# Patient Record
Sex: FEMALE | Race: WHITE | NOT HISPANIC OR LATINO | URBAN - METROPOLITAN AREA
[De-identification: names, ages, dates, MRNs, and addresses within clinical notes are randomized per-mention and may not be internally consistent; named-entity substitution may affect disease eponyms.]

---

## 2017-09-23 ENCOUNTER — EMERGENCY (EMERGENCY)
Facility: HOSPITAL | Age: 37
LOS: 1 days | Discharge: HOME | End: 2017-09-23
Attending: EMERGENCY MEDICINE | Admitting: EMERGENCY MEDICINE
Payer: MEDICAID

## 2017-09-23 VITALS
DIASTOLIC BLOOD PRESSURE: 87 MMHG | TEMPERATURE: 98 F | RESPIRATION RATE: 16 BRPM | HEART RATE: 88 BPM | OXYGEN SATURATION: 100 % | SYSTOLIC BLOOD PRESSURE: 128 MMHG

## 2017-09-23 DIAGNOSIS — R69 ILLNESS, UNSPECIFIED: ICD-10-CM

## 2017-09-23 DIAGNOSIS — F43.20 ADJUSTMENT DISORDER, UNSPECIFIED: ICD-10-CM

## 2017-09-23 PROCEDURE — 99284 EMERGENCY DEPT VISIT MOD MDM: CPT

## 2017-09-23 PROCEDURE — 90792 PSYCH DIAG EVAL W/MED SRVCS: CPT

## 2017-09-23 NOTE — ED PROVIDER NOTE - OBJECTIVE STATEMENT
37f, pmhx depression. Her mother called 911 due to agitation, throwing things at home. She had a recent hand surgery @ [facility] for a tendon repair. In the midst of complications from that, she found out this am her boyfriend has been cheating on her and she said the combination caused her to be very angry and throw things. Denies any new medical symptoms. Specifically, denies f/c, weight loss, change in skin or hair, intolerance to hot or cold. Denies any nre polyuria or polydipsia. Also denies any new headache, stiff neck, weakness, numbness, parasthesias, change in vision, ataxia, nausea or vomiting. Denies any recent drug use. no SI/HI.   for her hand, she saw her surgeon yesterday who rx an antibiotic. she said it was somewhat improved. she waw given a splint and told not to use her hand.
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
## 2017-09-23 NOTE — ED PROVIDER NOTE - MEDICAL DECISION MAKING DETAILS
for pt's hand. she has f/u on monday. told to continue her surgeon's recs. no need for ED plastics consult.   Patient presenting for psychiatric complaints. History and physical exam reveal no acute medical condition as the cause of the patient's presenting psychiatric symptoms. Specifically, the vital signs are within normal limits, the patient exhibits no signs of an acute neurological condition, thyroid dysfunction or infectious symptoms suggestive of encephalitis, meningitis or sepsis as a cause of their psychiatric complaints. The patient also shows no signs of acute alcohol or drug intoxication. The patient is medically clear for psychiatric evaluation. If at any time the patient's condition changes, the covering emergency physician should be consulted.

## 2017-09-23 NOTE — ED ADULT NURSE NOTE - OBJECTIVE STATEMENT
Received pt in  pt in [facility]  calm & cooperative denies Si/Hi/AVh at present eval on going.

## 2017-09-23 NOTE — ED ADULT TRIAGE NOTE - CHIEF COMPLAINT QUOTE
Pt brought in by EMS for BH eval/ aggressive behavior. 911 was called after pt was throwing things around in her house. Pt denies SI/HI. Pt also complaining of pain to Rt hand.

## 2017-09-23 NOTE — ED BEHAVIORAL HEALTH ASSESSMENT NOTE - SUMMARY
38 yo F sent in by EMS after trashing house.  Acutely affected by learning  was cheating on her.  Now calm and stable.  No SI or HI.  No AH or VH. Does not want admission and has capacity.  No clinical indication for admission.  Has outpatient psychiatrist.  To dicharge home.

## 2018-03-02 ENCOUNTER — EMERGENCY (EMERGENCY)
Facility: HOSPITAL | Age: 38
LOS: 1 days | Discharge: HOME | End: 2018-03-02
Admitting: EMERGENCY MEDICINE
Payer: MEDICAID

## 2018-03-02 VITALS
TEMPERATURE: 98 F | HEART RATE: 84 BPM | OXYGEN SATURATION: 100 % | RESPIRATION RATE: 17 BRPM | DIASTOLIC BLOOD PRESSURE: 93 MMHG | SYSTOLIC BLOOD PRESSURE: 110 MMHG

## 2018-03-02 PROCEDURE — 99283 EMERGENCY DEPT VISIT LOW MDM: CPT

## 2018-03-02 RX ADMIN — Medication 1 MILLIGRAM(S): at 17:18

## 2018-03-02 NOTE — ED PROVIDER NOTE - OBJECTIVE STATEMENT
36 y/o M hx Depression  BIBA      Denies falling, punching or kicking any objects. Denies pain, SOB, fever, chills, chest/ abdominal  discomfort. Denies SI/HI/AH/VH. Denies recent use of alcohol or illicit drugs. 36 y/o M hx Depression, Migraine   BIB family w c/o intermittent anxiety and panic attacks. States that has  recently prescribed Zoloft which seem not to be working . Admits that at times  she's nervous with a racing heart rate that last only for a few minutes. States that remembering her brother who   8 months ago is a trigger. Denies falling, punching or kicking any objects. Denies pain, SOB, fever, chills, chest/ abdominal  discomfort. Denies SI/HI/AH/VH. Denies recent use of alcohol or illicit drugs.  LNMP- 2018

## 2018-03-02 NOTE — ED ADULT TRIAGE NOTE - CHIEF COMPLAINT QUOTE
pt c/o worsening depression since her brother passed away, states "I am unable to function" denies si/hi/ah/vh. pt tearful in triage.  spoke with  NP, pt to go directly back.

## 2018-03-02 NOTE — ED PROVIDER NOTE - MEDICAL DECISION MAKING DETAILS
38 y/o M hx Depression, Migraine    Dx- Panic Attacks.   Tolerated ativan well.   Medical evaluation performed. There is no clinical evidence of intoxication or any acute medical problem requiring immediate intervention.  To follow up with the [facility].

## 2018-03-02 NOTE — ED ADULT NURSE REASSESSMENT NOTE - NS ED NURSE REASSESS COMMENT FT1
Patient cleared and discharged by CRISELDA Jose, discharge instructions given, pt verbalized understanding and left ER a&ox3 with .

## 2018-03-03 ENCOUNTER — EMERGENCY (EMERGENCY)
Facility: HOSPITAL | Age: 38
LOS: 1 days | Discharge: HOME | End: 2018-03-03
Attending: EMERGENCY MEDICINE | Admitting: EMERGENCY MEDICINE
Payer: MEDICAID

## 2018-03-03 VITALS
RESPIRATION RATE: 16 BRPM | SYSTOLIC BLOOD PRESSURE: 144 MMHG | OXYGEN SATURATION: 99 % | DIASTOLIC BLOOD PRESSURE: 82 MMHG | TEMPERATURE: 98 F | HEART RATE: 105 BPM

## 2018-03-03 DIAGNOSIS — Z63.4 DISAPPEARANCE AND DEATH OF FAMILY MEMBER: ICD-10-CM

## 2018-03-03 DIAGNOSIS — F32.9 MAJOR DEPRESSIVE DISORDER, SINGLE EPISODE, UNSPECIFIED: ICD-10-CM

## 2018-03-03 DIAGNOSIS — F41.9 ANXIETY DISORDER, UNSPECIFIED: ICD-10-CM

## 2018-03-03 PROCEDURE — 93010 ELECTROCARDIOGRAM REPORT: CPT

## 2018-03-03 PROCEDURE — 90792 PSYCH DIAG EVAL W/MED SRVCS: CPT

## 2018-03-03 PROCEDURE — 99284 EMERGENCY DEPT VISIT MOD MDM: CPT | Mod: 25

## 2018-03-03 RX ORDER — ZOLPIDEM TARTRATE 10 MG/1
5 TABLET ORAL AT BEDTIME
Qty: 0 | Refills: 0 | Status: DISCONTINUED | OUTPATIENT
Start: 2018-03-03 | End: 2018-03-03

## 2018-03-03 RX ADMIN — Medication 1 MILLIGRAM(S): at 03:23

## 2018-03-03 SDOH — SOCIAL STABILITY - SOCIAL INSECURITY: DISSAPEARANCE AND DEATH OF FAMILY MEMBER: Z63.4

## 2018-03-03 NOTE — ED ADULT NURSE REASSESSMENT NOTE - REASSESS COMMUNICATION
d/c instrcutions provided, pt to be picked up by bf for safe d/c to home/ED physician notified/family informed

## 2018-03-03 NOTE — ED ADULT NURSE REASSESSMENT NOTE - GENERAL PATIENT STATE
Received pt from RN break coverage, awake, slightly anxious, denies s/i h/i. Pt received STAT Ativan 1mg PO as ordered.

## 2018-03-03 NOTE — ED ADULT NURSE NOTE - OBJECTIVE STATEMENT
BIB self from home, pt arrives awake, anxious and restless. Pt reports she was d/c from  ED and referred to crisis clinic upon d/c. Pt is tearful regarding her brother's death in 2017. Reports she was sitting at dinner with her kids and then felt like "I couldn't do it". pt reports passive suicidal thoughts, denies intent or plan to harm self. Denies illicit drug use, reports drinking wine "today in the afternoon". Denies AVH.

## 2018-03-03 NOTE — ED BEHAVIORAL HEALTH ASSESSMENT NOTE - OTHER PAST PSYCHIATRIC HISTORY (INCLUDE DETAILS REGARDING ONSET, COURSE OF ILLNESS, INPATIENT/OUTPATIENT TREATMENT)
depression and anxiety -- past SSRI trials and current Buspar use depression and anxiety -- past SSRI trials, Buspar; on ambien and not taking the lexapro  No psychiatric care at present time

## 2018-03-03 NOTE — ED BEHAVIORAL HEALTH ASSESSMENT NOTE - ADDITIONAL DETAILS / COMMENTS
spoke with pt's BF who has no safety concerns. States that patient is able to take care of "everything" denies any recent suicidality, "she never said that she wants to hurt herself" pt is not talking to self, not internally preoccupied

## 2018-03-03 NOTE — ED BEHAVIORAL HEALTH ASSESSMENT NOTE - SUMMARY
36 yo F sent in by EMS after trashing house.  Acutely affected by learning  was cheating on her.  Now calm and stable.  No SI or HI.  No AH or VH. Does not want admission and has capacity.  No clinical indication for admission.  Has outpatient psychiatrist.  To dicharge home. 38 yo F with mild symptoms of depression and anxiety, also c/o insomnia. Prescribed ambien by her PCP, lexapro and valium for muscle spasm Did not take her meds earlier "because the pain management MD told "me not to take the medications if I am going to ER"   Now calm and stable.  No SI or HI.  No AH or VH. Does not want admission and has capacity.  No clinical indication for admission.  Has enough medications at home, except lexapro By the end of the interview she states that she is also prescribed Adderall  .  ASking for adderral ; when she was explained that she can't have it at night; she asked for ambien Ativan given Will discharge patient  home.

## 2018-03-03 NOTE — ED PROVIDER NOTE - OBJECTIVE STATEMENT
Pt is a 36 y/o F with PMHX of depression and chronic back pain presents to the ED with anxiety, palpitation, SOB x2 days. Pt is feeling guilty because her children depend on her and she has been feeling depressed since her brother  8 months ago. She reports crying every day. She was seen here early today, given Ativan, and sent to [facility]. She was told to come back if she was feeling unwell, so she returned. Children are safe w/ grandmother. No real SI. Given that second visit, will have psych see pt. Mentally seems fine. Will get EKG and UCG given the palpitations.

## 2018-03-03 NOTE — ED BEHAVIORAL HEALTH ASSESSMENT NOTE - DIFFERENTIAL
adjustment disorder with disturbance of conduct vs MDD vs NAOMI adjustment disorder with depressed and anxious mood  vs MDD vs NAOMI

## 2018-03-03 NOTE — ED BEHAVIORAL HEALTH ASSESSMENT NOTE - SUICIDE PROTECTIVE FACTORS
Identifies reasons for living/Responsibility to family and others Identifies reasons for living/Responsibility to family and others/Future oriented

## 2018-03-03 NOTE — ED PROVIDER NOTE - MEDICAL DECISION MAKING DETAILS
37F p/w anxiety, palpitation, SOB x 2 days.  EKG unremarkable.  Feeling depressed and tearful; seen here earlier today and sent home after PO ativan to f/u with [facility] crisis center.  EKG, UCG, psych eval.  If all acceptable, discharge home, follow up with your psychiatry within 1 week.

## 2018-03-03 NOTE — ED ADULT TRIAGE NOTE - CHIEF COMPLAINT QUOTE
Pt Walk in reports "Joel feeling Depress, If I stay at Home, probably I will hurt myself" No Plan Denies HI Hearing Voices, alcohol used/ drug used Dx. ADHD on medication.compliant Claimed seen here earlier and D/C. Pt Walk in reports "Joel feeling Depress, If I stay at Home, probably I will hurt myself" No Plan Denies HI Hearing Voices, alcohol used/ drug used Dx. Depression, Anxiety ADHD on medication(Lexapro/Ambien)compliant. Claimed seen here earlier and D/C.
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
## 2018-03-03 NOTE — ED BEHAVIORAL HEALTH ASSESSMENT NOTE - HPI (INCLUDE ILLNESS QUALITY, SEVERITY, DURATION, TIMING, CONTEXT, MODIFYING FACTORS, ASSOCIATED SIGNS AND SYMPTOMS)
Patient is a 36 yo F, domiciled with , recent wrist ligament surgery 2 days ago, psych hx notable for depression and anxiety, currently on Buspar and Valium (but for back spasms), brought in by EMS after breaking things in house.  EMS report is that she knocked 2 TVs on the wall and was throwing things around the house. Patient reports that she found out last night that  was sleeping with another person when he was supposed to be helping her given her extreme pain.      Denies SI, HI, AH, VH.  Denies any manic symptoms. States that sleep has always been difficult for her but eating well. Recently started on Buspar.  One prior psychiatric ED visit shortly after the birth of her first child for depression. Patient is a 37 yo F, domiciled with boyfriend, Hx of  wrist ligament surgery and migraines;  psych hx notable for depression and anxiety, currently non-compliant with meds (lexapro); taking Valium ( for back spasms), brought in by her boyfriend for psych evaluation for depression . Patient was in ER yesterday c/o episodes of crying, was given referral to Crisis center and was d/jazmin, she was recommended to return to ED if she does not "feel well" as per the patient She went tome and decided to come back to ER for evaluation. She reports that she told her MD's that she is coming to Er and was told not to take medications; she c/o backache at present time. Patient reports that she has been feeling depressed an somewhat anxious since she lost her brother to MI 8 month ago; she has been crying "every day"  She states that she feels sad, can't sleep "it's a chronic thing; I don't remember when I slept properly" Energy level is fair, able to concentrate and denies  feeling hopeless, No voices, visions or delusions denies any suicidality. She states that she hasn't been taking her meds today because her "pain management MD told her not to" and she feels sorry about. She states that she is taking 10 mg of ambien for sleep; Adderall for ADHD   Denies SI, HI, AH, VH.  Denies any manic symptoms. States that sleep has always been difficult for her but eating well. Recently started on Buspar.  One prior psychiatric ED visit shortly after the birth of her first child for depression. Patient is a 36 yo F, domiciled with boyfriend, Hx of  wrist ligament surgery and migraines;  psych hx notable for depression and anxiety, currently non-compliant with meds (lexapro); taking Valium ( for back spasms), brought in by her boyfriend for psych evaluation for depression . Patient was in ER yesterday c/o episodes of crying, was given referral to Crisis center and was d/jazmin, she was recommended to return to ED if she does not "feel well" as per the patient She went tome and decided to come back to ER for evaluation. She reports that she told her MD's that she is coming to Er and was told not to take medications; she c/o backache at present time. Patient reports that she has been feeling depressed an somewhat anxious since she lost her brother to MI 8 month ago; she has been crying "every day"  She states that she feels sad, can't sleep but "it's a chronic thing; I don't remember when I slept properly" Energy level is fair, able to concentrate and denies  feeling hopeless, No voices, visions or delusions denies any suicidality. She states that she hasn't been taking her meds today because her "pain management MD told her not to do so if she wants to go to ER " and she feels sorry about. She states that she is taking 10 mg of ambien for sleep; Adderall for ADHD 20 and 15 mg ; and Lexapro. But she does not have a psychiatrist at present time and her meds are prescribed by PCP She denies manic symptoms, no grandiosity, spending spree, no racing thoughts or pressured speech She states that she is able to take care of her home and "him" She states that Dr Beauchamp prescribes all her meds and that she does not have a psychiatrist at present time,   Denies SI, HI, AH, One prior psychiatric ED visit shortly after the birth of her first child for depression.

## 2018-03-03 NOTE — ED BEHAVIORAL HEALTH ASSESSMENT NOTE - DESCRIPTION
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

## 2018-03-03 NOTE — ED ADULT NURSE NOTE - CHIEF COMPLAINT QUOTE
Pt Walk in reports "Joel feeling Depress, If I stay at Home, probably I will hurt myself" No Plan Denies HI Hearing Voices, alcohol used/ drug used Dx. Depression, Anxiety ADHD on medication(Lexapro/Ambien)compliant. Claimed seen here earlier and D/C.

## 2018-03-03 NOTE — ED BEHAVIORAL HEALTH ASSESSMENT NOTE - RISK ASSESSMENT
Low -- no history of SI or HI and not psychotic. Low -- no history of SA,  SI or HI and not psychotic. Good support from BF In treatment with PCP ; will refer to crisis center

## 2018-12-21 PROBLEM — F32.9 MAJOR DEPRESSIVE DISORDER, SINGLE EPISODE, UNSPECIFIED: Chronic | Status: ACTIVE | Noted: 2017-09-23

## 2018-12-21 PROBLEM — G43.909 MIGRAINE, UNSPECIFIED, NOT INTRACTABLE, WITHOUT STATUS MIGRAINOSUS: Chronic | Status: ACTIVE | Noted: 2018-03-02

## 2019-02-01 ENCOUNTER — OUTPATIENT (OUTPATIENT)
Dept: OUTPATIENT SERVICES | Facility: HOSPITAL | Age: 39
LOS: 1 days | End: 2019-02-01
Payer: MEDICAID

## 2019-02-08 ENCOUNTER — EMERGENCY (EMERGENCY)
Facility: HOSPITAL | Age: 39
LOS: 1 days | Discharge: HOME | End: 2019-02-08
Admitting: EMERGENCY MEDICINE
Payer: MEDICAID

## 2019-02-08 VITALS
RESPIRATION RATE: 20 BRPM | DIASTOLIC BLOOD PRESSURE: 79 MMHG | OXYGEN SATURATION: 100 % | TEMPERATURE: 100 F | HEART RATE: 114 BPM | SYSTOLIC BLOOD PRESSURE: 155 MMHG

## 2019-02-08 PROCEDURE — 99283 EMERGENCY DEPT VISIT LOW MDM: CPT

## 2019-02-08 RX ADMIN — Medication 1 MILLIGRAM(S): at 14:46
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
## 2019-02-08 NOTE — ED PROVIDER NOTE - MEDICAL DECISION MAKING DETAILS
37 y/o M hx Depression, Migraine, Polysubstance Abuse  Medical evaluation performed. There is no clinical evidence of intoxication or any acute medical problem requiring immediate intervention. Discuss plan with patient's son- Balwinder- [phone], who denies any concern for patient safety.  Attempted to reach [facility] [place] [phone] , patient has missed her 1 pm. Office is now closed , recommend following up with [facility] and  Methadone. D/C home in company of son.
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
## 2019-02-11 DIAGNOSIS — Z71.89 OTHER SPECIFIED COUNSELING: ICD-10-CM

## 2020-02-01 PROCEDURE — G9001: CPT

## 2020-02-01 PROCEDURE — G9005: CPT

## 2020-10-12 NOTE — ED PROVIDER NOTE - RATE
[General Appearance - Alert] : alert [General Appearance - In No Acute Distress] : in no acute distress [Neck Appearance] : the appearance of the neck was normal [Neck Cervical Mass (___cm)] : no neck mass was observed [Jugular Venous Distention Increased] : there was no jugular-venous distention [Thyroid Diffuse Enlargement] : the thyroid was not enlarged [Thyroid Nodule] : there were no palpable thyroid nodules [Auscultation Breath Sounds / Voice Sounds] : lungs were clear to auscultation bilaterally [Heart Rate And Rhythm] : heart rate was normal and rhythm regular [Heart Sounds] : normal S1 and S2 [Heart Sounds Gallop] : no gallops [Murmurs] : no murmurs [Heart Sounds Pericardial Friction Rub] : no pericardial rub [Edema] : there was no peripheral edema [Bowel Sounds] : normal bowel sounds [Abdomen Soft] : soft [Abdomen Tenderness] : non-tender [] : no hepato-splenomegaly [Abdomen Mass (___ Cm)] : no abdominal mass palpated [No CVA Tenderness] : no ~M costovertebral angle tenderness [No Spinal Tenderness] : no spinal tenderness [Oriented To Time, Place, And Person] : oriented to person, place, and time [Impaired Insight] : insight and judgment were intact [Affect] : the affect was normal 80

## 2020-11-12 ENCOUNTER — EMERGENCY (EMERGENCY)
Facility: HOSPITAL | Age: 40
LOS: 1 days | Discharge: HOME | End: 2020-11-12
Attending: EMERGENCY MEDICINE
Payer: MEDICAID

## 2020-11-12 VITALS
HEIGHT: 67 IN | RESPIRATION RATE: 18 BRPM | DIASTOLIC BLOOD PRESSURE: 77 MMHG | WEIGHT: 149.91 LBS | OXYGEN SATURATION: 98 % | TEMPERATURE: 100 F | SYSTOLIC BLOOD PRESSURE: 110 MMHG | HEART RATE: 128 BPM

## 2020-11-12 DIAGNOSIS — F19.10 OTHER PSYCHOACTIVE SUBSTANCE ABUSE, UNCOMPLICATED: ICD-10-CM

## 2020-11-12 DIAGNOSIS — F11.20 OPIOID DEPENDENCE, UNCOMPLICATED: ICD-10-CM

## 2020-11-12 DIAGNOSIS — F32.9 MAJOR DEPRESSIVE DISORDER, SINGLE EPISODE, UNSPECIFIED: ICD-10-CM

## 2020-11-12 LAB
ALBUMIN SERPL ELPH-MCNC: 4.6 G/DL — SIGNIFICANT CHANGE UP (ref 3.3–5)
ALP SERPL-CCNC: 87 U/L — SIGNIFICANT CHANGE UP (ref 40–120)
ALT FLD-CCNC: 33 U/L — SIGNIFICANT CHANGE UP (ref 10–45)
ANION GAP SERPL CALC-SCNC: 13 MMOL/L — SIGNIFICANT CHANGE UP (ref 5–17)
APAP SERPL-MCNC: <15 UG/ML — SIGNIFICANT CHANGE UP (ref 10–30)
APPEARANCE UR: ABNORMAL
AST SERPL-CCNC: 44 U/L — HIGH (ref 10–40)
BASOPHILS # BLD AUTO: 0 K/UL — SIGNIFICANT CHANGE UP (ref 0–0.2)
BASOPHILS NFR BLD AUTO: 0 % — SIGNIFICANT CHANGE UP (ref 0–2)
BILIRUB SERPL-MCNC: 0.5 MG/DL — SIGNIFICANT CHANGE UP (ref 0.2–1.2)
BILIRUB UR-MCNC: ABNORMAL
BUN SERPL-MCNC: 19 MG/DL — SIGNIFICANT CHANGE UP (ref 7–23)
CALCIUM SERPL-MCNC: 9.5 MG/DL — SIGNIFICANT CHANGE UP (ref 8.4–10.5)
CHLORIDE SERPL-SCNC: 108 MMOL/L — SIGNIFICANT CHANGE UP (ref 96–108)
CO2 SERPL-SCNC: 23 MMOL/L — SIGNIFICANT CHANGE UP (ref 22–31)
COLOR SPEC: ABNORMAL
CREAT SERPL-MCNC: 1.07 MG/DL — SIGNIFICANT CHANGE UP (ref 0.5–1.3)
DIFF PNL FLD: NEGATIVE — SIGNIFICANT CHANGE UP
EOSINOPHIL # BLD AUTO: 0 K/UL — SIGNIFICANT CHANGE UP (ref 0–0.5)
EOSINOPHIL NFR BLD AUTO: 0 % — SIGNIFICANT CHANGE UP (ref 0–6)
ETHANOL SERPL-MCNC: SIGNIFICANT CHANGE UP MG/DL (ref 0–10)
GLUCOSE SERPL-MCNC: 101 MG/DL — HIGH (ref 70–99)
GLUCOSE UR QL: NEGATIVE — SIGNIFICANT CHANGE UP
HCG SERPL-ACNC: <2 MIU/ML — SIGNIFICANT CHANGE UP
HCT VFR BLD CALC: 38.2 % — SIGNIFICANT CHANGE UP (ref 34.5–45)
HGB BLD-MCNC: 12.3 G/DL — SIGNIFICANT CHANGE UP (ref 11.5–15.5)
KETONES UR-MCNC: ABNORMAL
LEUKOCYTE ESTERASE UR-ACNC: ABNORMAL
LYMPHOCYTES # BLD AUTO: 0.73 K/UL — LOW (ref 1–3.3)
LYMPHOCYTES # BLD AUTO: 10 % — LOW (ref 13–44)
MCHC RBC-ENTMCNC: 23.9 PG — LOW (ref 27–34)
MCHC RBC-ENTMCNC: 32.2 GM/DL — SIGNIFICANT CHANGE UP (ref 32–36)
MCV RBC AUTO: 74.3 FL — LOW (ref 80–100)
MONOCYTES # BLD AUTO: 0.67 K/UL — SIGNIFICANT CHANGE UP (ref 0–0.9)
MONOCYTES NFR BLD AUTO: 9.1 % — SIGNIFICANT CHANGE UP (ref 2–14)
NEUTROPHILS # BLD AUTO: 5.86 K/UL — SIGNIFICANT CHANGE UP (ref 1.8–7.4)
NEUTROPHILS NFR BLD AUTO: 80 % — HIGH (ref 43–77)
NITRITE UR-MCNC: NEGATIVE — SIGNIFICANT CHANGE UP
PCP SPEC-MCNC: SIGNIFICANT CHANGE UP
PH UR: 6 — SIGNIFICANT CHANGE UP (ref 5–8)
PLATELET # BLD AUTO: 358 K/UL — SIGNIFICANT CHANGE UP (ref 150–400)
POTASSIUM SERPL-MCNC: 4.2 MMOL/L — SIGNIFICANT CHANGE UP (ref 3.5–5.3)
POTASSIUM SERPL-SCNC: 4.2 MMOL/L — SIGNIFICANT CHANGE UP (ref 3.5–5.3)
PROT SERPL-MCNC: 7.8 G/DL — SIGNIFICANT CHANGE UP (ref 6–8.3)
PROT UR-MCNC: ABNORMAL
RBC # BLD: 5.14 M/UL — SIGNIFICANT CHANGE UP (ref 3.8–5.2)
RBC # FLD: 22.3 % — HIGH (ref 10.3–14.5)
SALICYLATES SERPL-MCNC: <2 MG/DL — LOW (ref 15–30)
SARS-COV-2 RNA SPEC QL NAA+PROBE: DETECTED
SODIUM SERPL-SCNC: 144 MMOL/L — SIGNIFICANT CHANGE UP (ref 135–145)
SP GR SPEC: 1.03 — HIGH (ref 1.01–1.02)
UROBILINOGEN FLD QL: ABNORMAL
WBC # BLD: 7.32 K/UL — SIGNIFICANT CHANGE UP (ref 3.8–10.5)
WBC # FLD AUTO: 7.32 K/UL — SIGNIFICANT CHANGE UP (ref 3.8–10.5)

## 2020-11-12 PROCEDURE — 93010 ELECTROCARDIOGRAM REPORT: CPT

## 2020-11-12 PROCEDURE — 90792 PSYCH DIAG EVAL W/MED SRVCS: CPT | Mod: 95

## 2020-11-12 PROCEDURE — 99285 EMERGENCY DEPT VISIT HI MDM: CPT

## 2020-11-12 RX ORDER — HYDROXYZINE HCL 10 MG
100 TABLET ORAL ONCE
Refills: 0 | Status: COMPLETED | OUTPATIENT
Start: 2020-11-12 | End: 2020-11-12

## 2020-11-12 RX ORDER — GABAPENTIN 400 MG/1
900 CAPSULE ORAL ONCE
Refills: 0 | Status: COMPLETED | OUTPATIENT
Start: 2020-11-12 | End: 2020-11-12

## 2020-11-12 RX ADMIN — GABAPENTIN 900 MILLIGRAM(S): 400 CAPSULE ORAL at 23:50

## 2020-11-12 RX ADMIN — Medication 100 MILLIGRAM(S): at 23:35

## 2020-11-12 RX ADMIN — Medication 2 MILLIGRAM(S): at 18:44

## 2020-11-12 NOTE — ED BEHAVIORAL HEALTH ASSESSMENT NOTE - PSYCHIATRIC ISSUES AND PLAN (INCLUDE STANDING AND PRN MEDICATION)
Can utilize home vistaril 100mg QHS PRN as well as neurontIn 900MG TID; would avoid benzo use in patient with known polysubstance use disorder
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
## 2020-11-12 NOTE — ED BEHAVIORAL HEALTH ASSESSMENT NOTE - CURRENT MEDICATION
Per on call resident for patient's psychiatrist, Dr. Stanley, patient is currently prescribed Abilify 20mg qdaily, neurontin 900mg TID, Lexapro 20mg Qdaily, vistaril 100mg QHS PRN, Adderall 10mg BID

## 2020-11-12 NOTE — ED BEHAVIORAL HEALTH ASSESSMENT NOTE - SUBSTANCE ISSUES AND PLAN (INCLUDE STANDING AND PRN MEDICATION)
Utox + for cocaine, benzos, amphetamines, methadone and THC; if methadone dose and administration today cannot be confirmed, can place patient on COWs and treat opioid withdrawal symptomatically
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
## 2020-11-12 NOTE — ED BEHAVIORAL HEALTH ASSESSMENT NOTE - COMMENTS ON SUICIDE RISK/PROTECTIVE FACTORS:
patient denies current or past suicidal ideation, intent or plan, however she has numerous risk factors including access to a firearm, substance intoxication and withdrawal, and chronic pain. Protective factors include her children and engagement with outpatient care and MAT.

## 2020-11-12 NOTE — ED BEHAVIORAL HEALTH ASSESSMENT NOTE - DIFFERENTIAL
Polysubstance use per hx  Opioid Use Disorder on MAT  R/o depressive disorder, unspecified  R/o substance induced affective disorder
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
## 2020-11-12 NOTE — ED BEHAVIORAL HEALTH ASSESSMENT NOTE - DETAILS
live with their girlfriends Denies current or prior suicidal ideation, intent or plan Refuses to divulge Patient notes gun ownership but refuses to divulge whereabouts of the gun + wears contacts +throat pain Endorses CP "as you were asking the same question 3x" +migraine this AM No phone number for ex boyfriend Have discussed with the ED

## 2020-11-12 NOTE — ED BEHAVIORAL HEALTH ASSESSMENT NOTE - COMMENTS ON VIOLENCE RISK/PROTECTIVE FACTORS:
Patient denies prior incarceration, denies homicidal ideation, intent or plan currently and has residential stability and is engaged in care. However, she has numerous risk factors for violence including substance use and firearm access.

## 2020-11-12 NOTE — ED PROVIDER NOTE - NSFOLLOWUPINSTRUCTIONS_ED_ALL_ED_FT
FIT FOR CONFINEMENT    1) Please follow-up with your primary care doctor in the next 2-3 days.  Please call tomorrow for an appointment.  If you cannot follow-up with your primary care doctor please return to the ED for any urgent issues. Follow up with your psychiatrist 5-6 days  2) You were given a copy of the tests performed today.  Please bring the results with you and review them with your primary care doctor.  3) If you have any worsening of symptoms or any other concerns please return to the ED immediately. Such as but not limited to suicidal or homicidal ideation  4) Please continue taking your home medications as directed.

## 2020-11-12 NOTE — ED PROVIDER NOTE - PROGRESS NOTE DETAILS
Dr. Donny Sharma, PGY-3: pt evaluated by psych, awaiting recs Dr. Donny Sharma, PGY-3: seen by psych. Recommended to hold until AM. Pt reported to psych owning gun but would not provide further information. Recommended hydroxyzine 100 mg and gabapentin 900 mg now. Recommended to avoid benzos, monitor CIWA, and COWS. Provided information for Psych Dr. Stanley ([phone]) in effort to try and determine methadone dose. Dr. Donny Sharma, PGY-3: after hours number for pt's psych is [phone] Patient signed out to me by Dr. Mckeon pending final psychiatry recommendations.  No events overnight.  Final psych opinion pending.  Will sign out to AM team pending psych re-eval and final recommendations.  Aaron Nolen M.D. April Hernandez MD: Pt clear by psych. Pt fit for confinement. Pt well appearing and asymptomatic. Pt is ambulatory and tolerating PO. Spoke with pt about return precautions. Pt agrees to follow up with their PCP. Pt ready for discharge

## 2020-11-12 NOTE — ED BEHAVIORAL HEALTH ASSESSMENT NOTE - AXIS IV
Problems with interaction with legal system/Occupational problems/Problem related to social environment

## 2020-11-12 NOTE — ED ADULT NURSE NOTE - SUICIDE RISK FACTORS
Current mood episode/Agitation/Severe Anxiety/Panic/History of abuse/trauma/Mood Disorder current/past

## 2020-11-12 NOTE — ED PROVIDER NOTE - PATIENT PORTAL LINK FT
You can access the FollowMyHealth Patient Portal offered by [facility] by registering at the following website: [email or web address]. By joining [employer]’s FollowMyHealth portal, you will also be able to view your health information using other applications (apps) compatible with our system.
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
## 2020-11-12 NOTE — ED BEHAVIORAL HEALTH ASSESSMENT NOTE - SUICIDE PROTECTIVE FACTORS
Responsibility to family and others Responsibility to family and others/Positive therapeutic relationships

## 2020-11-12 NOTE — ED PROVIDER NOTE - PHYSICAL EXAMINATION
Gen: NAD, AOx3, able to make needs known, non-toxic  Head: NCAT  HEENT: EOMI, oral mucosa moist, normal conjunctiva  Lung: CTAB, no respiratory distress, no wheezes/rhonchi/rales B/L, speaking in full sentences  CV: RRR, no murmurs  Abd: soft, NTND, no guarding  MSK: no visible deformities  Neuro: Appears non focal  Skin: Warm, well perfused  Psych: tearful, emotionally labile, tangential speech

## 2020-11-12 NOTE — ED BEHAVIORAL HEALTH ASSESSMENT NOTE - RISK ASSESSMENT
While patient denies current or prior suicidal ideation, intent or plan, she has numerous risk factors, including substance intoxication and withdrawal, access to a firearm, mood lability and chronic pain. Protective factors, at this time, include responsibility to her family, and a good therapeutic relationship with Dr. Stanley. Moderate Acute Suicide Risk

## 2020-11-12 NOTE — ED BEHAVIORAL HEALTH ASSESSMENT NOTE - PAST PSYCHOTROPIC MEDICATION
"A million of them."  Per chart review, patient has been on valium, buspar, zoloft, lexapro, ambien   Per psyches, patient last picked up adderall 10mg BID on 10/13/20

## 2020-11-12 NOTE — ED PROVIDER NOTE - SHIFT CHANGE DETAILS
40F with psych history pending psych re-evaluation in the AM. Screening COVID-PCR returns positive, unclear if active infection as patient reports having COVID in spring. Disposition per psychiatry.

## 2020-11-12 NOTE — ED ADULT NURSE NOTE - NSIMPLEMENTINTERV_GEN_ALL_ED
Implemented All Universal Safety Interventions:  [place] to call system. Call bell, personal items and telephone within reach. Instruct patient to call for assistance. Room bathroom lighting operational. Non-slip footwear when patient is off stretcher. Physically safe environment: no spills, clutter or unnecessary equipment. Stretcher in lowest position, wheels locked, appropriate side rails in place.

## 2020-11-12 NOTE — ED BEHAVIORAL HEALTH ASSESSMENT NOTE - DESCRIPTION
Migraines, polysubstance use, depression, Covid infection 12th grade education; currently unemployed - will not divulge what she was doing prior to the pandemic Please, refer to ED course note in chart.

## 2020-11-12 NOTE — ED BEHAVIORAL HEALTH ASSESSMENT NOTE - SUICIDE RISK FACTORS
History of abuse/trauma/Alcohol/Substance abuse disorders/Current mood episode/Chronic pain/Access to lethal methods (pills, firearm, etc.: Ask specifically about presence or absence of a firearm in the home or ease of accessing/Mood Disorder current/past/Conduct problems current/past

## 2020-11-12 NOTE — ED BEHAVIORAL HEALTH ASSESSMENT NOTE - REASON
Need to obtain further collateral from patient's  in the morning, especially with regards to gun ownership

## 2020-11-12 NOTE — ED BEHAVIORAL HEALTH ASSESSMENT NOTE - VIOLENCE RISK FACTORS:
Firearm/weapon access/Violent ideation/threat/speech/Substance abuse/Impulsivity/Affective dysregulation
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
## 2020-11-12 NOTE — ED PROVIDER NOTE - ATTENDING CONTRIBUTION TO CARE
Attending MD Jacob:  I personally have seen and examined this patient.  Resident note reviewed and agree on plan of care and except where noted.  See HPI, PE, and MDM for details.

## 2020-11-12 NOTE — ED BEHAVIORAL HEALTH ASSESSMENT NOTE - ACTIVATING EVENTS/STRESSORS
Home Triggering events leading to humiliation, shame, and/or despair (e.g. Loss of relationship, financial or health status) (real or anticipated)

## 2020-11-13 VITALS
OXYGEN SATURATION: 97 % | SYSTOLIC BLOOD PRESSURE: 111 MMHG | DIASTOLIC BLOOD PRESSURE: 58 MMHG | RESPIRATION RATE: 18 BRPM | HEART RATE: 81 BPM | TEMPERATURE: 98 F

## 2020-11-13 PROCEDURE — U0003: CPT

## 2020-11-13 PROCEDURE — 84702 CHORIONIC GONADOTROPIN TEST: CPT

## 2020-11-13 PROCEDURE — 85025 COMPLETE CBC W/AUTO DIFF WBC: CPT

## 2020-11-13 PROCEDURE — 80307 DRUG TEST PRSMV CHEM ANLYZR: CPT

## 2020-11-13 PROCEDURE — 80053 COMPREHEN METABOLIC PANEL: CPT

## 2020-11-13 PROCEDURE — 81001 URINALYSIS AUTO W/SCOPE: CPT

## 2020-11-13 PROCEDURE — 99285 EMERGENCY DEPT VISIT HI MDM: CPT

## 2020-11-13 PROCEDURE — 93005 ELECTROCARDIOGRAM TRACING: CPT

## 2020-11-13 RX ORDER — METHADONE HYDROCHLORIDE 40 MG/1
115 TABLET ORAL ONCE
Refills: 0 | Status: DISCONTINUED | OUTPATIENT
Start: 2020-11-13 | End: 2020-11-13

## 2020-11-13 RX ADMIN — METHADONE HYDROCHLORIDE 115 MILLIGRAM(S): 40 TABLET ORAL at 10:21
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
## 2020-11-13 NOTE — ED BEHAVIORAL HEALTH NOTE - BEHAVIORAL HEALTH NOTE
[facility] spoke to CRISELDA Gibson whom called back after Dr. Cummings left message; gave number as point of contact [phone] for next evaluating psychiatrist. Collateral endorses patient is a patient at [facility] and has been compliant with appointments; was last seen on November 9th and is due today for her Methadone. Collateral denies noted psychiatric symptoms at this time, does endorse patient meets with Dr. Roland for psychiatry services.

## 2020-11-13 NOTE — ED ADULT NURSE REASSESSMENT NOTE - NS ED NURSE REASSESS COMMENT FT1
pt. is to remain in ED for psychiatric reassessment in am, as per Telepsych. 1:1 CO for aggression maintained.
pt. remains on 1:1 CO for aggression, on police custody and left wrist  handcuffed to the chair. no physical complaints noted. will continue to monitor.
pt. was interviewed in a private room w/ 1:1 staff in the room via Telepsych cart # 2.

## 2020-12-08 ENCOUNTER — TRANSCRIPTION ENCOUNTER (OUTPATIENT)
Age: 40
End: 2020-12-08

## 2021-07-16 ENCOUNTER — APPOINTMENT (OUTPATIENT)
Dept: WOUND CARE | Facility: CLINIC | Age: 41
End: 2021-07-16

## 2021-07-17 ENCOUNTER — EMERGENCY (EMERGENCY)
Facility: HOSPITAL | Age: 41
LOS: 1 days | Discharge: LEFT BEFORE DISCHARGE | End: 2021-07-17
Attending: STUDENT IN AN ORGANIZED HEALTH CARE EDUCATION/TRAINING PROGRAM | Admitting: STUDENT IN AN ORGANIZED HEALTH CARE EDUCATION/TRAINING PROGRAM
Payer: MEDICAID

## 2021-07-17 VITALS
OXYGEN SATURATION: 100 % | DIASTOLIC BLOOD PRESSURE: 84 MMHG | RESPIRATION RATE: 17 BRPM | HEART RATE: 73 BPM | TEMPERATURE: 98 F | SYSTOLIC BLOOD PRESSURE: 127 MMHG

## 2021-07-17 VITALS
HEART RATE: 79 BPM | TEMPERATURE: 99 F | HEIGHT: 67 IN | OXYGEN SATURATION: 99 % | RESPIRATION RATE: 17 BRPM | DIASTOLIC BLOOD PRESSURE: 62 MMHG | SYSTOLIC BLOOD PRESSURE: 95 MMHG

## 2021-07-17 LAB
ANION GAP SERPL CALC-SCNC: 13 MMOL/L — SIGNIFICANT CHANGE UP (ref 7–14)
BASOPHILS # BLD AUTO: 0.06 K/UL — SIGNIFICANT CHANGE UP (ref 0–0.2)
BASOPHILS NFR BLD AUTO: 0.8 % — SIGNIFICANT CHANGE UP (ref 0–2)
BUN SERPL-MCNC: 11 MG/DL — SIGNIFICANT CHANGE UP (ref 7–23)
CALCIUM SERPL-MCNC: 8.6 MG/DL — SIGNIFICANT CHANGE UP (ref 8.4–10.5)
CHLORIDE SERPL-SCNC: 107 MMOL/L — SIGNIFICANT CHANGE UP (ref 98–107)
CO2 SERPL-SCNC: 23 MMOL/L — SIGNIFICANT CHANGE UP (ref 22–31)
CREAT SERPL-MCNC: 0.92 MG/DL — SIGNIFICANT CHANGE UP (ref 0.5–1.3)
EOSINOPHIL # BLD AUTO: 0.44 K/UL — SIGNIFICANT CHANGE UP (ref 0–0.5)
EOSINOPHIL NFR BLD AUTO: 6.1 % — HIGH (ref 0–6)
GLUCOSE SERPL-MCNC: 101 MG/DL — HIGH (ref 70–99)
HCG SERPL-ACNC: <5 MIU/ML — SIGNIFICANT CHANGE UP
HCT VFR BLD CALC: 32.8 % — LOW (ref 34.5–45)
HGB BLD-MCNC: 10.1 G/DL — LOW (ref 11.5–15.5)
IANC: 5.01 K/UL — SIGNIFICANT CHANGE UP (ref 1.5–8.5)
IMM GRANULOCYTES NFR BLD AUTO: 0.3 % — SIGNIFICANT CHANGE UP (ref 0–1.5)
LYMPHOCYTES # BLD AUTO: 1.16 K/UL — SIGNIFICANT CHANGE UP (ref 1–3.3)
LYMPHOCYTES # BLD AUTO: 16 % — SIGNIFICANT CHANGE UP (ref 13–44)
MAGNESIUM SERPL-MCNC: 2.1 MG/DL — SIGNIFICANT CHANGE UP (ref 1.6–2.6)
MCHC RBC-ENTMCNC: 25.6 PG — LOW (ref 27–34)
MCHC RBC-ENTMCNC: 30.8 GM/DL — LOW (ref 32–36)
MCV RBC AUTO: 83 FL — SIGNIFICANT CHANGE UP (ref 80–100)
MONOCYTES # BLD AUTO: 0.54 K/UL — SIGNIFICANT CHANGE UP (ref 0–0.9)
MONOCYTES NFR BLD AUTO: 7.5 % — SIGNIFICANT CHANGE UP (ref 2–14)
NEUTROPHILS # BLD AUTO: 5.01 K/UL — SIGNIFICANT CHANGE UP (ref 1.8–7.4)
NEUTROPHILS NFR BLD AUTO: 69.3 % — SIGNIFICANT CHANGE UP (ref 43–77)
NRBC # BLD: 0 /100 WBCS — SIGNIFICANT CHANGE UP
NRBC # FLD: 0 K/UL — SIGNIFICANT CHANGE UP
PHOSPHATE SERPL-MCNC: 3.1 MG/DL — SIGNIFICANT CHANGE UP (ref 2.5–4.5)
PLATELET # BLD AUTO: 239 K/UL — SIGNIFICANT CHANGE UP (ref 150–400)
POTASSIUM SERPL-MCNC: 5.4 MMOL/L — HIGH (ref 3.5–5.3)
POTASSIUM SERPL-SCNC: 5.4 MMOL/L — HIGH (ref 3.5–5.3)
RBC # BLD: 3.95 M/UL — SIGNIFICANT CHANGE UP (ref 3.8–5.2)
RBC # FLD: 13.5 % — SIGNIFICANT CHANGE UP (ref 10.3–14.5)
SODIUM SERPL-SCNC: 143 MMOL/L — SIGNIFICANT CHANGE UP (ref 135–145)
WBC # BLD: 7.23 K/UL — SIGNIFICANT CHANGE UP (ref 3.8–10.5)
WBC # FLD AUTO: 7.23 K/UL — SIGNIFICANT CHANGE UP (ref 3.8–10.5)

## 2021-07-17 PROCEDURE — 99284 EMERGENCY DEPT VISIT MOD MDM: CPT | Mod: 25

## 2021-07-17 PROCEDURE — 93010 ELECTROCARDIOGRAM REPORT: CPT

## 2021-07-17 RX ORDER — METHADONE HYDROCHLORIDE 40 MG/1
100 TABLET ORAL ONCE
Refills: 0 | Status: DISCONTINUED | OUTPATIENT
Start: 2021-07-17 | End: 2021-07-17

## 2021-07-17 RX ORDER — SODIUM CHLORIDE 9 MG/ML
1000 INJECTION INTRAMUSCULAR; INTRAVENOUS; SUBCUTANEOUS ONCE
Refills: 0 | Status: COMPLETED | OUTPATIENT
Start: 2021-07-17 | End: 2021-07-17

## 2021-07-17 RX ADMIN — SODIUM CHLORIDE 1000 MILLILITER(S): 9 INJECTION INTRAMUSCULAR; INTRAVENOUS; SUBCUTANEOUS at 10:16

## 2021-07-17 RX ADMIN — METHADONE HYDROCHLORIDE 100 MILLIGRAM(S): 40 TABLET ORAL at 11:26

## 2021-07-17 NOTE — ED ADULT TRIAGE NOTE - PRO INTERPRETER NEED 2
No referring provider defined for this encounter.        02/07/18        Patient: Albania Gr   YOB: 1973   Date of Visit: 2/1/2018       Dear  Dr. Stephan Cedeno,      Thank you for referring Tad Thorpe to my English

## 2021-07-17 NOTE — ED ADULT NURSE NOTE - HOW OFTEN DO YOU HAVE A DRINK CONTAINING ALCOHOL?
Never

## 2021-07-17 NOTE — ED PROVIDER NOTE - ATTENDING CONTRIBUTION TO CARE
I have personally performed a face to face medical and diagnostic evaluation of the patient. I have discussed with and reviewed the Resident's note and agree with the History, ROS, Physical Exam and MDM unless otherwise indicated. A brief summary of my personal evaluation and impression can be found below.    41yo female depression, opioid abuse on methadone, chronic RLE wound follows with wound care sent from methadone clinic for drowsiness. Patient states didn't sleep well last night, took two 5mg oxycodone pills last night around 10pm, denies any other co-ingestants. Lives with mother Wendy, spoke to her and not concerned for any other ingestion. Patient has not taken trazodone in 4 days. Denies dysuria, fever, N/V/D, syncope, lightheadedness. Last methadone on Thursday, takes 100 mg daily. Missed yesterday due to not feeling well and was not given it today.      41yo F hx depression, opioid abuse on methadone, chronic RLE wound followed by wound care sent by methadone clinic for drowsiness. Pt states last 2 nights has not slept well because she has been trying to wean herself off her trazodone and been feeling sleepy throughout the day because of so, also took her 2x 5mg oxy last night, denies other co-ingestants. Spoke to mother who has no additional concerns. Notes she hasn't been drinking enough fluid recently due to leg injury. Denies fever, chills, cp, sob, abd pain, n/v/d, change in leg wound. No SI/HI.  VITALS: Initial triage and subsequent vitals have been reviewed by me.  Gen: Well appearing, NAD, alert, non-toxic  Head: NCAT  HEENT: MMM, normal conjunctiva, anicteric, neck supple  Lung: CTAB, no adventitious sounds  CV: RRR, no murmurs, 2+symmetric peripheral pulses  Abd: soft, NTND, no rebound or guarding, no palpable masses  MSK: No edema, no visible deformities  Neuro: Moving all extremity grossly, following commands appropriately, fluid speech  Skin: Warm and dry, RLE ww/ linear wound w/ granulation tissue nonindurated not acutely infected appearing  Psych: normal mood and affect  Inc drowsiness likely 2/2 not sleeping well 2/2 not taking trazodone vs side effect of oxy. Appears well and not clinically weak or drowsy at this time. No other systemic sx, no infx sx. Will check lytes and give iv hydration and reassess.

## 2021-07-17 NOTE — ED PROVIDER NOTE - CLINICAL SUMMARY MEDICAL DECISION MAKING FREE TEXT BOX
41yo female opioid abus 41yo female on methadone sent by methadone clinic for drowsiness. not currently drowsy here in ED and ambulatory. Possibly 2/2 med side effects vs poor sleep. Does not seem infectious. Soft BP, will check orthostatics, hydrate, check basic labs, reassess.

## 2021-07-17 NOTE — ED ADULT NURSE NOTE - NSIMPLEMENTINTERV_GEN_ALL_ED
Implemented All Fall Risk Interventions:  [place] to call system. Call bell, personal items and telephone within reach. Instruct patient to call for assistance. Room bathroom lighting operational. Non-slip footwear when patient is off stretcher. Physically safe environment: no spills, clutter or unnecessary equipment. Stretcher in lowest position, wheels locked, appropriate side rails in place. Provide visual cue, wrist band, yellow gown, etc. Monitor gait and stability. Monitor for mental status changes and reorient to person, place, and time. Review medications for side effects contributing to fall risk. Reinforce activity limits and safety measures with patient and family.

## 2021-07-17 NOTE — ED ADULT TRIAGE NOTE - CHIEF COMPLAINT QUOTE
pt sent over from the methadone clinic after appearing drowsy when going to  her methadone. pt later admits to EMS that she took 2, 5mg oxycodone earlier in the morning. pt arrives with boot to Right leg 2/2 prior fx.  f/s 127 via EMS.

## 2021-07-17 NOTE — ED PROVIDER NOTE - OBJECTIVE STATEMENT
41yo female depression, opioid abuse on methadone, chronic RLE wound follows with wound care sent from methadone clinic for drowsiness. Patient states didn't sleep well last night, took two 5mg oxycodone pills last night around 10pm, denies any other co-ingestants. Lives with mother Wendy, spoke to her and not concerned for any other ingestion. Patient has not taken trazodone in 4 days. Denies dysuria, fever, N/V/D, syncope, lightheadedness. 39yo female depression, opioid abuse on methadone, chronic RLE wound follows with wound care sent from methadone clinic for drowsiness. Patient states didn't sleep well last night, took two 5mg oxycodone pills last night around 10pm, denies any other co-ingestants. Lives with mother Wendy, spoke to her and not concerned for any other ingestion. Patient has not taken trazodone in 4 days. Denies dysuria, fever, N/V/D, syncope, lightheadedness. Last methadone on Thursday, takes 100 mg daily. Missed yesterday due to not feeling well and was not given it today.

## 2021-07-17 NOTE — ED PROVIDER NOTE - PATIENT PORTAL LINK FT
You can access the FollowMyHealth Patient Portal offered by [facility] by registering at the following website: [email or web address]. By joining [email or web address]’s FollowMyHealth portal, you will also be able to view your health information using other applications (apps) compatible with our system.

## 2021-07-17 NOTE — ED PROVIDER NOTE - PHYSICAL EXAMINATION
Physical Exam:  Gen: NAD, AOx3, non-toxic appearing, able to ambulate without assistance  Head: NCAT  HEENT: EOMI, PEERLA, normal conjunctiva, tongue midline, oral mucosa moist  Lung: CTAB, no respiratory distress, no wheezes/rhonchi/rales B/L, speaking in full sentences  CV: RRR, no murmurs, rubs or gallops, distal pulses 2+ b/l  Abd: soft, NT, ND, no guarding, no rigidity, no rebound tenderness, no CVA tenderness   Skin: Warm, well perfused, no rash, chronic R ankle wound, does not appear acutely infected  Psych: normal affect, calm

## 2021-07-17 NOTE — ED PROVIDER NOTE - NSFOLLOWUPINSTRUCTIONS_ED_ALL_ED_FT
- Continue all regular medications  - stay well hydrated  - Follow up with your primary doctor within 3 days  - You were given copies of labs and/or imaging results if applicable, please take them to your follow up appointments  - Return to the ER for any worsening symptoms or concerns

## 2021-07-17 NOTE — ED PROVIDER NOTE - PROGRESS NOTE DETAILS
Dylon Lazaro DO - Verified methadone dose with documentation with patient chart that confirms methadone dose of 100mg [id number] effective 7/12/21, expiration 7/11/22 and that dose was not administered today. The patient wishes to be discharged against medical advice. I have assessed the patient's mental status and  the patient has capacity to make this decision. I have explained the risks of leaving without full treatment, including severe disability and death, which the patient understands and is willing to accept. I have answered all of the patient's questions. I reiterated my medical opinion and advised the patient to return at any time. We discussed the further workup outside of the current visit and return precautions.

## 2021-07-17 NOTE — ED ADULT NURSE NOTE - OBJECTIVE STATEMENT
Patient alert and awake, oriented x 4, breathing with ease on room air, skin intact, ambulates with baseline with boot to right leg r/t injury. Patient sent to ED from methadone clinic due to appearing lethargic. Patient alert and awake now, reports taking oxycodone prior.

## 2021-07-19 ENCOUNTER — OUTPATIENT (OUTPATIENT)
Dept: OUTPATIENT SERVICES | Facility: HOSPITAL | Age: 41
LOS: 1 days | Discharge: HOME | End: 2021-07-19

## 2021-07-20 DIAGNOSIS — F13.10 SEDATIVE, HYPNOTIC OR ANXIOLYTIC ABUSE, UNCOMPLICATED: ICD-10-CM

## 2021-07-20 DIAGNOSIS — F15.10 OTHER STIMULANT ABUSE, UNCOMPLICATED: ICD-10-CM

## 2021-07-20 DIAGNOSIS — F11.20 OPIOID DEPENDENCE, UNCOMPLICATED: ICD-10-CM

## 2021-08-01 ENCOUNTER — OUTPATIENT (OUTPATIENT)
Dept: OUTPATIENT SERVICES | Facility: HOSPITAL | Age: 41
LOS: 1 days | End: 2021-08-01
Payer: MEDICAID

## 2021-08-03 ENCOUNTER — APPOINTMENT (OUTPATIENT)
Dept: WOUND CARE | Facility: CLINIC | Age: 41
End: 2021-08-03

## 2021-08-12 DIAGNOSIS — Z71.89 OTHER SPECIFIED COUNSELING: ICD-10-CM

## 2021-08-17 ENCOUNTER — NON-APPOINTMENT (OUTPATIENT)
Age: 41
End: 2021-08-17

## 2021-08-18 ENCOUNTER — APPOINTMENT (OUTPATIENT)
Dept: WOUND CARE | Facility: CLINIC | Age: 41
End: 2021-08-18

## 2021-09-13 ENCOUNTER — APPOINTMENT (OUTPATIENT)
Dept: ORTHOPEDIC SURGERY | Facility: CLINIC | Age: 41
End: 2021-09-13
Payer: MEDICAID

## 2021-09-13 VITALS
HEIGHT: 66.5 IN | DIASTOLIC BLOOD PRESSURE: 69 MMHG | BODY MASS INDEX: 28.59 KG/M2 | WEIGHT: 180 LBS | HEART RATE: 79 BPM | SYSTOLIC BLOOD PRESSURE: 99 MMHG

## 2021-09-13 DIAGNOSIS — F41.8 OTHER SPECIFIED ANXIETY DISORDERS: ICD-10-CM

## 2021-09-13 DIAGNOSIS — F19.10 OTHER PSYCHOACTIVE SUBSTANCE ABUSE, UNCOMPLICATED: ICD-10-CM

## 2021-09-13 PROCEDURE — 73590 X-RAY EXAM OF LOWER LEG: CPT | Mod: RT

## 2021-09-13 PROCEDURE — 99204 OFFICE O/P NEW MOD 45 MIN: CPT

## 2021-09-14 DIAGNOSIS — G47.9 SLEEP DISORDER, UNSPECIFIED: ICD-10-CM

## 2021-09-14 DIAGNOSIS — Z81.3 FAMILY HISTORY OF OTHER PSYCHOACTIVE SUBSTANCE ABUSE AND DEPENDENCE: ICD-10-CM

## 2021-09-14 DIAGNOSIS — F32.9 MAJOR DEPRESSIVE DISORDER, SINGLE EPISODE, UNSPECIFIED: ICD-10-CM

## 2021-09-14 DIAGNOSIS — Z82.49 FAMILY HISTORY OF ISCHEMIC HEART DISEASE AND OTHER DISEASES OF THE CIRCULATORY SYSTEM: ICD-10-CM

## 2021-09-14 DIAGNOSIS — R51.9 HEADACHE, UNSPECIFIED: ICD-10-CM

## 2021-09-14 DIAGNOSIS — R63.5 ABNORMAL WEIGHT GAIN: ICD-10-CM

## 2021-09-14 DIAGNOSIS — R60.0 LOCALIZED EDEMA: ICD-10-CM

## 2021-09-14 DIAGNOSIS — Z80.1 FAMILY HISTORY OF MALIGNANT NEOPLASM OF TRACHEA, BRONCHUS AND LUNG: ICD-10-CM

## 2021-09-14 DIAGNOSIS — R68.83 CHILLS (WITHOUT FEVER): ICD-10-CM

## 2021-09-14 DIAGNOSIS — M25.50 PAIN IN UNSPECIFIED JOINT: ICD-10-CM

## 2021-09-14 DIAGNOSIS — Z87.898 PERSONAL HISTORY OF OTHER SPECIFIED CONDITIONS: ICD-10-CM

## 2021-09-14 DIAGNOSIS — Z78.9 OTHER SPECIFIED HEALTH STATUS: ICD-10-CM

## 2021-09-14 DIAGNOSIS — M25.40 EFFUSION, UNSPECIFIED JOINT: ICD-10-CM

## 2021-09-14 DIAGNOSIS — K59.00 CONSTIPATION, UNSPECIFIED: ICD-10-CM

## 2021-09-14 LAB
25(OH)D3 SERPL-MCNC: 22.8 NG/ML
BASOPHILS # BLD AUTO: 0.05 K/UL
BASOPHILS NFR BLD AUTO: 0.5 %
CALCIUM SERPL-MCNC: 9.3 MG/DL
CALCIUM SERPL-MCNC: 9.3 MG/DL
CRP SERPL-MCNC: <3 MG/L
EOSINOPHIL # BLD AUTO: 0.2 K/UL
EOSINOPHIL NFR BLD AUTO: 1.8 %
ERYTHROCYTE [SEDIMENTATION RATE] IN BLOOD BY WESTERGREN METHOD: 9 MM/HR
HCT VFR BLD CALC: 39.7 %
HGB BLD-MCNC: 12.7 G/DL
IMM GRANULOCYTES NFR BLD AUTO: 0.2 %
LYMPHOCYTES # BLD AUTO: 2.78 K/UL
LYMPHOCYTES NFR BLD AUTO: 25.3 %
MAN DIFF?: NORMAL
MCHC RBC-ENTMCNC: 25.4 PG
MCHC RBC-ENTMCNC: 32 GM/DL
MCV RBC AUTO: 79.4 FL
MONOCYTES # BLD AUTO: 0.56 K/UL
MONOCYTES NFR BLD AUTO: 5.1 %
NEUTROPHILS # BLD AUTO: 7.37 K/UL
NEUTROPHILS NFR BLD AUTO: 67.1 %
PARATHYROID HORMONE INTACT: 41 PG/ML
PLATELET # BLD AUTO: 277 K/UL
RBC # BLD: 5 M/UL
RBC # FLD: 15 %
T3 SERPL-MCNC: 90 NG/DL
T3FREE SERPL-MCNC: 2.72 PG/ML
T4 FREE SERPL-MCNC: 1.1 NG/DL
T4 SERPL-MCNC: 6.2 UG/DL
TSH SERPL-ACNC: 2.75 UIU/ML
WBC # FLD AUTO: 10.98 K/UL

## 2021-09-21 ENCOUNTER — APPOINTMENT (OUTPATIENT)
Dept: CT IMAGING | Facility: CLINIC | Age: 41
End: 2021-09-21
Payer: MEDICAID

## 2021-09-21 ENCOUNTER — OUTPATIENT (OUTPATIENT)
Dept: OUTPATIENT SERVICES | Facility: HOSPITAL | Age: 41
LOS: 1 days | End: 2021-09-21
Payer: MEDICAID

## 2021-09-21 DIAGNOSIS — F41.8 OTHER SPECIFIED ANXIETY DISORDERS: ICD-10-CM

## 2021-09-21 DIAGNOSIS — Z00.8 ENCOUNTER FOR OTHER GENERAL EXAMINATION: ICD-10-CM

## 2021-09-21 PROCEDURE — 73700 CT LOWER EXTREMITY W/O DYE: CPT | Mod: 26,RT

## 2021-09-21 PROCEDURE — 73700 CT LOWER EXTREMITY W/O DYE: CPT

## 2021-09-27 ENCOUNTER — APPOINTMENT (OUTPATIENT)
Dept: ORTHOPEDIC SURGERY | Facility: CLINIC | Age: 41
End: 2021-09-27
Payer: MEDICAID

## 2021-09-27 ENCOUNTER — OUTPATIENT (OUTPATIENT)
Dept: OUTPATIENT SERVICES | Facility: HOSPITAL | Age: 41
LOS: 1 days | End: 2021-09-27
Payer: MEDICAID

## 2021-09-27 VITALS
HEIGHT: 67 IN | HEART RATE: 82 BPM | SYSTOLIC BLOOD PRESSURE: 112 MMHG | DIASTOLIC BLOOD PRESSURE: 70 MMHG | RESPIRATION RATE: 16 BRPM | WEIGHT: 205.03 LBS | OXYGEN SATURATION: 98 % | TEMPERATURE: 98 F

## 2021-09-27 VITALS
BODY MASS INDEX: 28.59 KG/M2 | DIASTOLIC BLOOD PRESSURE: 76 MMHG | SYSTOLIC BLOOD PRESSURE: 120 MMHG | WEIGHT: 180 LBS | HEIGHT: 66.5 IN | HEART RATE: 99 BPM

## 2021-09-27 DIAGNOSIS — F41.9 ANXIETY DISORDER, UNSPECIFIED: ICD-10-CM

## 2021-09-27 DIAGNOSIS — T84.84XA PAIN DUE TO INTERNAL ORTHOPEDIC PROSTHETIC DEVICES, IMPLANTS AND GRAFTS, INITIAL ENCOUNTER: ICD-10-CM

## 2021-09-27 DIAGNOSIS — Z98.890 OTHER SPECIFIED POSTPROCEDURAL STATES: Chronic | ICD-10-CM

## 2021-09-27 DIAGNOSIS — F19.10 OTHER PSYCHOACTIVE SUBSTANCE ABUSE, UNCOMPLICATED: ICD-10-CM

## 2021-09-27 LAB
ANION GAP SERPL CALC-SCNC: 11 MMOL/L — SIGNIFICANT CHANGE UP (ref 7–14)
BLD GP AB SCN SERPL QL: NEGATIVE — SIGNIFICANT CHANGE UP
BUN SERPL-MCNC: 11 MG/DL — SIGNIFICANT CHANGE UP (ref 7–23)
CALCIUM SERPL-MCNC: 8.8 MG/DL — SIGNIFICANT CHANGE UP (ref 8.4–10.5)
CHLORIDE SERPL-SCNC: 102 MMOL/L — SIGNIFICANT CHANGE UP (ref 98–107)
CO2 SERPL-SCNC: 26 MMOL/L — SIGNIFICANT CHANGE UP (ref 22–31)
CREAT SERPL-MCNC: 0.95 MG/DL — SIGNIFICANT CHANGE UP (ref 0.5–1.3)
GLUCOSE SERPL-MCNC: 82 MG/DL — SIGNIFICANT CHANGE UP (ref 70–99)
HCG UR QL: NEGATIVE — SIGNIFICANT CHANGE UP
HCT VFR BLD CALC: 33.2 % — LOW (ref 34.5–45)
HGB BLD-MCNC: 11 G/DL — LOW (ref 11.5–15.5)
MCHC RBC-ENTMCNC: 26.4 PG — LOW (ref 27–34)
MCHC RBC-ENTMCNC: 33.1 GM/DL — SIGNIFICANT CHANGE UP (ref 32–36)
MCV RBC AUTO: 79.8 FL — LOW (ref 80–100)
NRBC # BLD: 0 /100 WBCS — SIGNIFICANT CHANGE UP
NRBC # FLD: 0 K/UL — SIGNIFICANT CHANGE UP
PLATELET # BLD AUTO: 213 K/UL — SIGNIFICANT CHANGE UP (ref 150–400)
POTASSIUM SERPL-MCNC: 4.1 MMOL/L — SIGNIFICANT CHANGE UP (ref 3.5–5.3)
POTASSIUM SERPL-SCNC: 4.1 MMOL/L — SIGNIFICANT CHANGE UP (ref 3.5–5.3)
RBC # BLD: 4.16 M/UL — SIGNIFICANT CHANGE UP (ref 3.8–5.2)
RBC # FLD: 14.9 % — HIGH (ref 10.3–14.5)
RH IG SCN BLD-IMP: POSITIVE — SIGNIFICANT CHANGE UP
SODIUM SERPL-SCNC: 139 MMOL/L — SIGNIFICANT CHANGE UP (ref 135–145)
WBC # BLD: 11.18 K/UL — HIGH (ref 3.8–10.5)
WBC # FLD AUTO: 11.18 K/UL — HIGH (ref 3.8–10.5)

## 2021-09-27 PROCEDURE — 93010 ELECTROCARDIOGRAM REPORT: CPT

## 2021-09-27 PROCEDURE — 99214 OFFICE O/P EST MOD 30 MIN: CPT

## 2021-09-27 RX ORDER — SODIUM CHLORIDE 9 MG/ML
1000 INJECTION, SOLUTION INTRAVENOUS
Refills: 0 | Status: DISCONTINUED | OUTPATIENT
Start: 2021-10-01 | End: 2021-10-15

## 2021-09-27 NOTE — H&P PST ADULT - BACK EXAM
normal shape/ROM intact/strength intact [Consultation] : a consultation visit [FreeTextEntry1] : facial twitching

## 2021-09-27 NOTE — H&P PST ADULT - PROBLEM SELECTOR PLAN 2
Pt instructed to obtain medical eval due to h/o substance abuse x >10 years, pt able to verbalize understanding, surgeon notified via email.  Pt instructed to take methadone AM of procedure.

## 2021-09-27 NOTE — H&P PST ADULT - ATTENDING COMMENTS
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  Indicated for dynamization by removal or proximal interlocks.  R/B/A discussed

## 2021-09-27 NOTE — H&P PST ADULT - NEGATIVE NEUROLOGICAL SYMPTOMS
no transient paralysis/no paresthesias/no generalized seizures/no vertigo/no loss of sensation/no difficulty walking

## 2021-09-27 NOTE — H&P PST ADULT - MUSCULOSKELETAL
details… detailed exam RLE/no joint swelling/no joint erythema/no calf tenderness/decreased ROM RLE, healed incisions/no joint swelling/no joint erythema/no calf tenderness/decreased ROM

## 2021-09-27 NOTE — H&P PST ADULT - NSICDXPASTMEDICALHX_GEN_ALL_CORE_FT
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 RUSSELL [phone], on 100 mg Methadone for at least a few months. Serenity WU

## 2021-09-27 NOTE — H&P PST ADULT - NSICDXFAMILYHX_GEN_ALL_CORE_FT
FAMILY HISTORY:  Father  Still living? No  FH: lung cancer, Age at diagnosis: Age Unknown    Sibling  Still living? No  FH: heart disease, Age at diagnosis: Age Unknown

## 2021-09-27 NOTE — H&P PST ADULT - HISTORY OF PRESENT ILLNESS
40 yo female with preop dx. pain due to internal prosthetic orthopedic device presents to pre surgical testing.  Pt s/p right open tibia fracture 5/2021, s/p ORIF.  Pt continues to complain of RLL pain s/p xray and CT.  Pt is scheduled for right removal proximal interlocks.

## 2021-09-27 NOTE — H&P PST ADULT - PROBLEM SELECTOR PLAN 1
Pt is scheduled for right removal proximal interlocks on 10/1/21.  Verbal and written pre op instructions reviewed with patient and pt able to verbalize understanding.   Verbal and written instructions given with chlorhexidine wash, pt able to verbalize understanding via teach back method.  Pt instructed to follow surgeon's guidelines regarding COVID testing preop.   Sterile cup given, pt instructed to bring urine sample AM of surgery for UCG and pt able to verbalize understanding.

## 2021-09-27 NOTE — H&P PST ADULT - PROBLEM SELECTOR PLAN 3
Pt instructed to take Lexapro, Abilify, and Vistaril AM of surgery with a sip of water, pt able to verbalize understanding.

## 2021-09-27 NOTE — H&P PST ADULT - NSICDXPASTSURGICALHX_GEN_ALL_CORE_FT
PAST SURGICAL HISTORY:  S/P ORIF (open reduction internal fixation) fracture right tibia 5/2021     PAST SURGICAL HISTORY:  History of back surgery lumbar laminectomy 2002    History of hand surgery 2017 right    S/P ORIF (open reduction internal fixation) fracture right tibia 5/2021

## 2021-09-27 NOTE — H&P PST ADULT - PSYCHIATRIC COMMENTS
talks to counselor almost daily, in [facility] pleasant but easily distracted, flight of ideas

## 2021-09-28 ENCOUNTER — LABORATORY RESULT (OUTPATIENT)
Age: 41
End: 2021-09-28

## 2021-09-28 ENCOUNTER — APPOINTMENT (OUTPATIENT)
Dept: DISASTER EMERGENCY | Facility: CLINIC | Age: 41
End: 2021-09-28

## 2021-09-30 ENCOUNTER — TRANSCRIPTION ENCOUNTER (OUTPATIENT)
Age: 41
End: 2021-09-30

## 2021-09-30 NOTE — ASU PATIENT PROFILE, ADULT - NSICDXPASTMEDICALHX_GEN_ALL_CORE_FT
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 RUSSELL [phone], on 100 mg Methadone for at least a few months. Serenity WU

## 2021-09-30 NOTE — ASU PATIENT PROFILE, ADULT - TEACHING/LEARNING LEARNING PREFERENCES
skill demonstration/verbal instruction/written material individual instruction/skill demonstration/verbal instruction/written material

## 2021-09-30 NOTE — ASU PATIENT PROFILE, ADULT - NSICDXPASTSURGICALHX_GEN_ALL_CORE_FT
PAST SURGICAL HISTORY:  History of back surgery lumbar laminectomy 2002    History of hand surgery 2017 right    S/P ORIF (open reduction internal fixation) fracture right tibia 5/2021

## 2021-10-01 ENCOUNTER — OUTPATIENT (OUTPATIENT)
Dept: OUTPATIENT SERVICES | Facility: HOSPITAL | Age: 41
LOS: 1 days | Discharge: HOME | End: 2021-10-01
Payer: MEDICAID

## 2021-10-01 ENCOUNTER — APPOINTMENT (OUTPATIENT)
Dept: ORTHOPEDIC SURGERY | Facility: HOSPITAL | Age: 41
End: 2021-10-01

## 2021-10-01 VITALS
HEART RATE: 74 BPM | HEIGHT: 67 IN | WEIGHT: 205.03 LBS | OXYGEN SATURATION: 96 % | SYSTOLIC BLOOD PRESSURE: 106 MMHG | TEMPERATURE: 98 F | DIASTOLIC BLOOD PRESSURE: 60 MMHG | RESPIRATION RATE: 14 BRPM

## 2021-10-01 VITALS
HEART RATE: 62 BPM | RESPIRATION RATE: 16 BRPM | SYSTOLIC BLOOD PRESSURE: 108 MMHG | DIASTOLIC BLOOD PRESSURE: 78 MMHG | OXYGEN SATURATION: 93 %

## 2021-10-01 DIAGNOSIS — Z98.890 OTHER SPECIFIED POSTPROCEDURAL STATES: Chronic | ICD-10-CM

## 2021-10-01 DIAGNOSIS — T84.84XA PAIN DUE TO INTERNAL ORTHOPEDIC PROSTHETIC DEVICES, IMPLANTS AND GRAFTS, INITIAL ENCOUNTER: ICD-10-CM

## 2021-10-01 LAB
HCG UR QL: NEGATIVE — SIGNIFICANT CHANGE UP
RH IG SCN BLD-IMP: POSITIVE — SIGNIFICANT CHANGE UP

## 2021-10-01 PROCEDURE — 20680 REMOVAL OF IMPLANT DEEP: CPT | Mod: RT

## 2021-10-01 RX ORDER — IBUPROFEN 200 MG
1 TABLET ORAL
Qty: 20 | Refills: 0
Start: 2021-10-01 | End: 2021-10-05

## 2021-10-01 RX ORDER — METHADONE HYDROCHLORIDE 40 MG/1
100 TABLET ORAL
Qty: 0 | Refills: 0 | DISCHARGE

## 2021-10-01 RX ORDER — ESCITALOPRAM OXALATE 10 MG/1
1 TABLET, FILM COATED ORAL
Qty: 0 | Refills: 0 | DISCHARGE

## 2021-10-01 RX ORDER — SUMATRIPTAN SUCCINATE 4 MG/.5ML
1 INJECTION, SOLUTION SUBCUTANEOUS
Qty: 0 | Refills: 0 | DISCHARGE

## 2021-10-01 RX ORDER — ARIPIPRAZOLE 15 MG/1
1 TABLET ORAL
Qty: 0 | Refills: 0 | DISCHARGE

## 2021-10-01 RX ORDER — TRAZODONE HCL 50 MG
1 TABLET ORAL
Qty: 0 | Refills: 0 | DISCHARGE

## 2021-10-01 RX ORDER — FENTANYL CITRATE 50 UG/ML
50 INJECTION INTRAVENOUS
Refills: 0 | Status: DISCONTINUED | OUTPATIENT
Start: 2021-10-01 | End: 2021-10-01

## 2021-10-01 RX ORDER — HYDROXYZINE HCL 10 MG
1 TABLET ORAL
Qty: 0 | Refills: 0 | DISCHARGE

## 2021-10-01 RX ORDER — GABAPENTIN 400 MG/1
800 CAPSULE ORAL
Qty: 0 | Refills: 0 | DISCHARGE

## 2021-10-01 RX ORDER — SODIUM CHLORIDE 9 MG/ML
1000 INJECTION, SOLUTION INTRAVENOUS
Refills: 0 | Status: DISCONTINUED | OUTPATIENT
Start: 2021-10-01 | End: 2021-10-15

## 2021-10-01 RX ORDER — ONDANSETRON 8 MG/1
4 TABLET, FILM COATED ORAL ONCE
Refills: 0 | Status: DISCONTINUED | OUTPATIENT
Start: 2021-10-01 | End: 2021-10-15

## 2021-10-01 RX ORDER — OXYCODONE HYDROCHLORIDE 5 MG/1
5 TABLET ORAL ONCE
Refills: 0 | Status: DISCONTINUED | OUTPATIENT
Start: 2021-10-01 | End: 2021-10-01

## 2021-10-01 RX ADMIN — SODIUM CHLORIDE 100 MILLILITER(S): 9 INJECTION, SOLUTION INTRAVENOUS at 08:59

## 2021-10-01 NOTE — BRIEF OPERATIVE NOTE - NSICDXBRIEFPOSTOP_GEN_ALL_CORE_FT
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

## 2021-10-01 NOTE — ASU DISCHARGE PLAN (ADULT/PEDIATRIC) - CALL YOUR DOCTOR IF YOU HAVE ANY OF THE FOLLOWING:
Bleeding that does not stop/Swelling that gets worse/Unable to urinate/Inability to tolerate liquids or foods

## 2021-10-01 NOTE — ASU DISCHARGE PLAN (ADULT/PEDIATRIC) - BATHING
can shower, keep aquacel dressing in place and pat dry can shower, keep aquacel dressing in place and pat dry/No change

## 2021-10-01 NOTE — BRIEF OPERATIVE NOTE - NSICDXBRIEFPREOP_GEN_ALL_CORE_FT
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

## 2021-10-01 NOTE — ASU DISCHARGE PLAN (ADULT/PEDIATRIC) - CARE PROVIDER_API CALL
Ben Waterman (MD)  Orthopaedic Surgery  [address]  [place] [address]  Phone: [phone]  Fax: [phone]  Follow Up Time:

## 2021-10-01 NOTE — ASU DISCHARGE PLAN (ADULT/PEDIATRIC) - ASU DC SPECIAL INSTRUCTIONSFT
s/p R tibia removal of proximal screws for dynamization  weight bearing as tolerated right lower extremity  Keep aquacel dressing clean and intact, can shower and pat dry after  Follow up in 2 weeks with Dr. Waterman

## 2021-10-04 ENCOUNTER — APPOINTMENT (OUTPATIENT)
Dept: ORTHOPEDIC SURGERY | Facility: CLINIC | Age: 41
End: 2021-10-04
Payer: MEDICAID

## 2021-10-04 VITALS
HEART RATE: 82 BPM | SYSTOLIC BLOOD PRESSURE: 122 MMHG | BODY MASS INDEX: 28.59 KG/M2 | HEIGHT: 66.5 IN | DIASTOLIC BLOOD PRESSURE: 76 MMHG | WEIGHT: 180 LBS

## 2021-10-04 PROBLEM — F19.10 OTHER PSYCHOACTIVE SUBSTANCE ABUSE, UNCOMPLICATED: Chronic | Status: ACTIVE | Noted: 2019-02-08

## 2021-10-04 PROBLEM — F41.9 ANXIETY DISORDER, UNSPECIFIED: Chronic | Status: ACTIVE | Noted: 2021-09-27

## 2021-10-04 PROBLEM — T84.84XA PAIN DUE TO INTERNAL ORTHOPEDIC PROSTHETIC DEVICES, IMPLANTS AND GRAFTS, INITIAL ENCOUNTER: Chronic | Status: ACTIVE | Noted: 2021-09-27

## 2021-10-04 PROCEDURE — 99024 POSTOP FOLLOW-UP VISIT: CPT

## 2021-10-04 PROCEDURE — 73590 X-RAY EXAM OF LOWER LEG: CPT | Mod: RT

## 2021-10-04 RX ORDER — TRAMADOL HYDROCHLORIDE 50 MG/1
50 TABLET, COATED ORAL EVERY 8 HOURS
Qty: 21 | Refills: 0 | Status: ACTIVE | COMMUNITY
Start: 2021-10-04 | End: 1900-01-01

## 2021-10-19 ENCOUNTER — APPOINTMENT (OUTPATIENT)
Dept: ORTHOPEDIC SURGERY | Facility: CLINIC | Age: 41
End: 2021-10-19
Payer: MEDICAID

## 2021-10-19 PROCEDURE — 99024 POSTOP FOLLOW-UP VISIT: CPT

## 2021-10-31 NOTE — ED PROVIDER NOTE - NS ED NOTE AC HIGH RISK COUNTRIES
Bedside report received from Matilda ALLISON and assumed Pt's cares. Call light within reach. Safety measures in place.   No

## 2021-11-08 ENCOUNTER — APPOINTMENT (OUTPATIENT)
Dept: ORTHOPEDIC SURGERY | Facility: CLINIC | Age: 41
End: 2021-11-08
Payer: MEDICAID

## 2021-11-08 VITALS
BODY MASS INDEX: 28.59 KG/M2 | HEIGHT: 66.5 IN | HEART RATE: 83 BPM | DIASTOLIC BLOOD PRESSURE: 73 MMHG | SYSTOLIC BLOOD PRESSURE: 108 MMHG | WEIGHT: 180 LBS

## 2021-11-08 PROCEDURE — 99024 POSTOP FOLLOW-UP VISIT: CPT

## 2021-11-08 PROCEDURE — 73590 X-RAY EXAM OF LOWER LEG: CPT | Mod: RT

## 2021-11-29 ENCOUNTER — APPOINTMENT (OUTPATIENT)
Dept: ORTHOPEDIC SURGERY | Facility: CLINIC | Age: 41
End: 2021-11-29
Payer: MEDICAID

## 2021-11-29 VITALS — WEIGHT: 170 LBS | BODY MASS INDEX: 27 KG/M2 | HEIGHT: 66.5 IN

## 2021-11-29 PROCEDURE — 99024 POSTOP FOLLOW-UP VISIT: CPT

## 2021-11-29 PROCEDURE — 73590 X-RAY EXAM OF LOWER LEG: CPT | Mod: RT

## 2021-12-01 PROCEDURE — G9005: CPT

## 2021-12-13 ENCOUNTER — APPOINTMENT (OUTPATIENT)
Dept: ORTHOPEDIC SURGERY | Facility: CLINIC | Age: 41
End: 2021-12-13

## 2022-01-25 ENCOUNTER — APPOINTMENT (OUTPATIENT)
Dept: ORTHOPEDIC SURGERY | Facility: CLINIC | Age: 42
End: 2022-01-25

## 2022-02-07 ENCOUNTER — APPOINTMENT (OUTPATIENT)
Dept: ORTHOPEDIC SURGERY | Facility: CLINIC | Age: 42
End: 2022-02-07

## 2022-03-16 NOTE — ED BEHAVIORAL HEALTH ASSESSMENT NOTE - NS ED BHA AXIS I SECONDARY2 CODE FT
F41.9

## 2022-03-21 ENCOUNTER — APPOINTMENT (OUTPATIENT)
Dept: ORTHOPEDIC SURGERY | Facility: CLINIC | Age: 42
End: 2022-03-21

## 2022-05-03 ENCOUNTER — APPOINTMENT (OUTPATIENT)
Dept: ORTHOPEDIC SURGERY | Facility: CLINIC | Age: 42
End: 2022-05-03
Payer: MEDICAID

## 2022-05-03 PROCEDURE — 99213 OFFICE O/P EST LOW 20 MIN: CPT

## 2022-05-03 PROCEDURE — 73590 X-RAY EXAM OF LOWER LEG: CPT | Mod: RT

## 2022-06-27 ENCOUNTER — APPOINTMENT (OUTPATIENT)
Dept: ORTHOPEDIC SURGERY | Facility: CLINIC | Age: 42
End: 2022-06-27

## 2022-07-11 ENCOUNTER — APPOINTMENT (OUTPATIENT)
Dept: ORTHOPEDIC SURGERY | Facility: CLINIC | Age: 42
End: 2022-07-11

## 2022-08-29 ENCOUNTER — APPOINTMENT (OUTPATIENT)
Dept: ORTHOPEDIC SURGERY | Facility: CLINIC | Age: 42
End: 2022-08-29

## 2022-10-03 ENCOUNTER — APPOINTMENT (OUTPATIENT)
Dept: ORTHOPEDIC SURGERY | Facility: CLINIC | Age: 42
End: 2022-10-03

## 2022-10-03 DIAGNOSIS — S82.201K UNSPECIFIED FRACTURE OF SHAFT OF RIGHT TIBIA, SUBSEQUENT ENCOUNTER FOR CLOSED FRACTURE WITH NONUNION: ICD-10-CM

## 2022-10-03 DIAGNOSIS — M76.821 POSTERIOR TIBIAL TENDINITIS, RIGHT LEG: ICD-10-CM

## 2022-10-03 DIAGNOSIS — S82.401K UNSPECIFIED FRACTURE OF SHAFT OF RIGHT TIBIA, SUBSEQUENT ENCOUNTER FOR CLOSED FRACTURE WITH NONUNION: ICD-10-CM

## 2022-10-03 PROCEDURE — 73610 X-RAY EXAM OF ANKLE: CPT | Mod: RT

## 2022-10-03 PROCEDURE — 99213 OFFICE O/P EST LOW 20 MIN: CPT

## 2022-10-04 ENCOUNTER — APPOINTMENT (OUTPATIENT)
Dept: ULTRASOUND IMAGING | Facility: IMAGING CENTER | Age: 42
End: 2022-10-04

## 2022-10-04 ENCOUNTER — APPOINTMENT (OUTPATIENT)
Dept: MAMMOGRAPHY | Facility: IMAGING CENTER | Age: 42
End: 2022-10-04

## 2022-10-07 PROBLEM — S82.201K: Status: ACTIVE | Noted: 2021-09-10

## 2022-10-07 PROBLEM — M76.821 POSTERIOR TIBIAL TENDINITIS OF RIGHT LOWER EXTREMITY: Status: ACTIVE | Noted: 2022-10-03

## 2023-08-31 ENCOUNTER — OUTPATIENT (OUTPATIENT)
Dept: OUTPATIENT SERVICES | Facility: HOSPITAL | Age: 43
LOS: 1 days | Discharge: HOME | End: 2023-08-31
Payer: MEDICAID

## 2023-08-31 DIAGNOSIS — Z98.890 OTHER SPECIFIED POSTPROCEDURAL STATES: Chronic | ICD-10-CM

## 2023-09-01 DIAGNOSIS — F31.9 BIPOLAR DISORDER, UNSPECIFIED: ICD-10-CM

## 2023-09-01 DIAGNOSIS — F14.20 COCAINE DEPENDENCE, UNCOMPLICATED: ICD-10-CM

## 2023-09-01 DIAGNOSIS — F12.20 CANNABIS DEPENDENCE, UNCOMPLICATED: ICD-10-CM

## 2023-09-01 DIAGNOSIS — F11.20 OPIOID DEPENDENCE, UNCOMPLICATED: ICD-10-CM

## 2023-09-01 DIAGNOSIS — F32.A DEPRESSION, UNSPECIFIED: ICD-10-CM

## 2024-03-20 PROCEDURE — ZZZZZ: CPT

## 2024-03-27 PROCEDURE — ZZZZZ: CPT

## 2024-12-06 ENCOUNTER — EMERGENCY (EMERGENCY)
Facility: HOSPITAL | Age: 44
LOS: 1 days | Discharge: HOME | End: 2024-12-06
Attending: EMERGENCY MEDICINE | Admitting: EMERGENCY MEDICINE
Payer: MEDICAID

## 2024-12-06 VITALS
SYSTOLIC BLOOD PRESSURE: 97 MMHG | RESPIRATION RATE: 14 BRPM | DIASTOLIC BLOOD PRESSURE: 60 MMHG | OXYGEN SATURATION: 98 % | WEIGHT: 179.9 LBS | HEART RATE: 67 BPM | TEMPERATURE: 98 F

## 2024-12-06 VITALS
OXYGEN SATURATION: 100 % | SYSTOLIC BLOOD PRESSURE: 94 MMHG | RESPIRATION RATE: 18 BRPM | TEMPERATURE: 97 F | HEART RATE: 74 BPM | DIASTOLIC BLOOD PRESSURE: 68 MMHG

## 2024-12-06 DIAGNOSIS — Z98.890 OTHER SPECIFIED POSTPROCEDURAL STATES: Chronic | ICD-10-CM

## 2024-12-06 LAB
ALBUMIN SERPL ELPH-MCNC: 3.9 G/DL — SIGNIFICANT CHANGE UP (ref 3.3–5)
ALP SERPL-CCNC: 61 U/L — SIGNIFICANT CHANGE UP (ref 40–120)
ALT FLD-CCNC: 13 U/L — SIGNIFICANT CHANGE UP (ref 4–33)
AMPHET UR-MCNC: POSITIVE
ANION GAP SERPL CALC-SCNC: 11 MMOL/L — SIGNIFICANT CHANGE UP (ref 7–14)
ANISOCYTOSIS BLD QL: SLIGHT — SIGNIFICANT CHANGE UP
APAP SERPL-MCNC: <10 UG/ML — LOW (ref 15–25)
APPEARANCE UR: ABNORMAL
AST SERPL-CCNC: 18 U/L — SIGNIFICANT CHANGE UP (ref 4–32)
BACTERIA # UR AUTO: ABNORMAL /HPF
BARBITURATES UR SCN-MCNC: NEGATIVE — SIGNIFICANT CHANGE UP
BASOPHILS # BLD AUTO: 0 K/UL — SIGNIFICANT CHANGE UP (ref 0–0.2)
BASOPHILS NFR BLD AUTO: 0 % — SIGNIFICANT CHANGE UP (ref 0–2)
BENZODIAZ UR-MCNC: POSITIVE
BILIRUB SERPL-MCNC: <0.2 MG/DL — SIGNIFICANT CHANGE UP (ref 0.2–1.2)
BILIRUB UR-MCNC: NEGATIVE — SIGNIFICANT CHANGE UP
BUN SERPL-MCNC: 20 MG/DL — SIGNIFICANT CHANGE UP (ref 7–23)
CALCIUM SERPL-MCNC: 9.2 MG/DL — SIGNIFICANT CHANGE UP (ref 8.4–10.5)
CAST: 15 /LPF — HIGH (ref 0–4)
CHLORIDE SERPL-SCNC: 103 MMOL/L — SIGNIFICANT CHANGE UP (ref 98–107)
CO2 SERPL-SCNC: 23 MMOL/L — SIGNIFICANT CHANGE UP (ref 22–31)
COCAINE METAB.OTHER UR-MCNC: NEGATIVE — SIGNIFICANT CHANGE UP
COD CRY URNS QL: PRESENT
COLOR SPEC: SIGNIFICANT CHANGE UP
CREAT SERPL-MCNC: 1.26 MG/DL — SIGNIFICANT CHANGE UP (ref 0.5–1.3)
CREATININE URINE RESULT, DAU: 325 MG/DL — SIGNIFICANT CHANGE UP
DIFF PNL FLD: NEGATIVE — SIGNIFICANT CHANGE UP
EGFR: 54 ML/MIN/1.73M2 — LOW
ELLIPTOCYTES BLD QL SMEAR: SLIGHT — SIGNIFICANT CHANGE UP
EOSINOPHIL # BLD AUTO: 0.28 K/UL — SIGNIFICANT CHANGE UP (ref 0–0.5)
EOSINOPHIL NFR BLD AUTO: 4 % — SIGNIFICANT CHANGE UP (ref 0–6)
ETHANOL SERPL-MCNC: <10 MG/DL — SIGNIFICANT CHANGE UP
FENTANYL UR QL SCN: POSITIVE
FLUAV AG NPH QL: SIGNIFICANT CHANGE UP
FLUBV AG NPH QL: SIGNIFICANT CHANGE UP
GLUCOSE SERPL-MCNC: 135 MG/DL — HIGH (ref 70–99)
GLUCOSE UR QL: NEGATIVE MG/DL — SIGNIFICANT CHANGE UP
HCG SERPL-ACNC: <1 MIU/ML — SIGNIFICANT CHANGE UP
HCT VFR BLD CALC: 28.4 % — LOW (ref 34.5–45)
HGB BLD-MCNC: 8.3 G/DL — LOW (ref 11.5–15.5)
HYALINE CASTS # UR AUTO: PRESENT
HYPOCHROMIA BLD QL: SIGNIFICANT CHANGE UP
IANC: 4.09 K/UL — SIGNIFICANT CHANGE UP (ref 1.8–7.4)
KETONES UR-MCNC: ABNORMAL MG/DL
LEUKOCYTE ESTERASE UR-ACNC: ABNORMAL
LYMPHOCYTES # BLD AUTO: 2.05 K/UL — SIGNIFICANT CHANGE UP (ref 1–3.3)
LYMPHOCYTES # BLD AUTO: 29 % — SIGNIFICANT CHANGE UP (ref 13–44)
MANUAL SMEAR VERIFICATION: SIGNIFICANT CHANGE UP
MCHC RBC-ENTMCNC: 19.9 PG — LOW (ref 27–34)
MCHC RBC-ENTMCNC: 29.2 G/DL — LOW (ref 32–36)
MCV RBC AUTO: 68.1 FL — LOW (ref 80–100)
METHADONE UR-MCNC: POSITIVE
MICROCYTES BLD QL: SLIGHT — SIGNIFICANT CHANGE UP
MONOCYTES # BLD AUTO: 0.28 K/UL — SIGNIFICANT CHANGE UP (ref 0–0.9)
MONOCYTES NFR BLD AUTO: 4 % — SIGNIFICANT CHANGE UP (ref 2–14)
MUCOUS THREADS # UR AUTO: PRESENT
NEUTROPHILS # BLD AUTO: 4.1 K/UL — SIGNIFICANT CHANGE UP (ref 1.8–7.4)
NEUTROPHILS NFR BLD AUTO: 58 % — SIGNIFICANT CHANGE UP (ref 43–77)
NITRITE UR-MCNC: NEGATIVE — SIGNIFICANT CHANGE UP
NRBC # BLD: 0 /100 WBCS — SIGNIFICANT CHANGE UP (ref 0–0)
OPIATES UR-MCNC: NEGATIVE — SIGNIFICANT CHANGE UP
OVALOCYTES BLD QL SMEAR: SLIGHT — SIGNIFICANT CHANGE UP
OXYCODONE UR-MCNC: NEGATIVE — SIGNIFICANT CHANGE UP
PCP SPEC-MCNC: SIGNIFICANT CHANGE UP
PCP UR-MCNC: NEGATIVE — SIGNIFICANT CHANGE UP
PH UR: 6.5 — SIGNIFICANT CHANGE UP (ref 5–8)
PLAT MORPH BLD: NORMAL — SIGNIFICANT CHANGE UP
PLATELET # BLD AUTO: 249 K/UL — SIGNIFICANT CHANGE UP (ref 150–400)
PLATELET COUNT - ESTIMATE: NORMAL — SIGNIFICANT CHANGE UP
POIKILOCYTOSIS BLD QL AUTO: SLIGHT — SIGNIFICANT CHANGE UP
POLYCHROMASIA BLD QL SMEAR: SLIGHT — SIGNIFICANT CHANGE UP
POTASSIUM SERPL-MCNC: 3.8 MMOL/L — SIGNIFICANT CHANGE UP (ref 3.5–5.3)
POTASSIUM SERPL-SCNC: 3.8 MMOL/L — SIGNIFICANT CHANGE UP (ref 3.5–5.3)
PROT SERPL-MCNC: 6.8 G/DL — SIGNIFICANT CHANGE UP (ref 6–8.3)
PROT UR-MCNC: SIGNIFICANT CHANGE UP MG/DL
RBC # BLD: 4.17 M/UL — SIGNIFICANT CHANGE UP (ref 3.8–5.2)
RBC # FLD: 17.7 % — HIGH (ref 10.3–14.5)
RBC BLD AUTO: ABNORMAL
RBC CASTS # UR COMP ASSIST: 4 /HPF — SIGNIFICANT CHANGE UP (ref 0–4)
REVIEW: SIGNIFICANT CHANGE UP
RSV RNA NPH QL NAA+NON-PROBE: SIGNIFICANT CHANGE UP
SALICYLATES SERPL-MCNC: <0.3 MG/DL — LOW (ref 15–30)
SARS-COV-2 RNA SPEC QL NAA+PROBE: SIGNIFICANT CHANGE UP
SODIUM SERPL-SCNC: 137 MMOL/L — SIGNIFICANT CHANGE UP (ref 135–145)
SP GR SPEC: 1.02 — SIGNIFICANT CHANGE UP (ref 1–1.03)
SQUAMOUS # UR AUTO: 3 /HPF — SIGNIFICANT CHANGE UP (ref 0–5)
THC UR QL: NEGATIVE — SIGNIFICANT CHANGE UP
TOXICOLOGY SCREEN, DRUGS OF ABUSE, SERUM RESULT: SIGNIFICANT CHANGE UP
UROBILINOGEN FLD QL: 1 MG/DL — SIGNIFICANT CHANGE UP (ref 0.2–1)
VARIANT LYMPHS # BLD: 5 % — SIGNIFICANT CHANGE UP (ref 0–6)
WBC # BLD: 7.07 K/UL — SIGNIFICANT CHANGE UP (ref 3.8–10.5)
WBC # FLD AUTO: 7.07 K/UL — SIGNIFICANT CHANGE UP (ref 3.8–10.5)
WBC UR QL: 1 /HPF — SIGNIFICANT CHANGE UP (ref 0–5)

## 2024-12-06 PROCEDURE — 93010 ELECTROCARDIOGRAM REPORT: CPT

## 2024-12-06 PROCEDURE — 71045 X-RAY EXAM CHEST 1 VIEW: CPT | Mod: 26

## 2024-12-06 PROCEDURE — 99284 EMERGENCY DEPT VISIT MOD MDM: CPT

## 2024-12-06 RX ORDER — SODIUM CHLORIDE 9 MG/ML
1000 INJECTION, SOLUTION INTRAMUSCULAR; INTRAVENOUS; SUBCUTANEOUS ONCE
Refills: 0 | Status: COMPLETED | OUTPATIENT
Start: 2024-12-06 | End: 2024-12-06

## 2024-12-06 RX ADMIN — SODIUM CHLORIDE 1000 MILLILITER(S): 9 INJECTION, SOLUTION INTRAMUSCULAR; INTRAVENOUS; SUBCUTANEOUS at 09:25

## 2024-12-06 NOTE — SBIRT NOTE ADULT - NSSBIRTDRGPASSREFTXDET_GEN_A_CORE
Screening results were reviewed with the patient. Patient was provided educational materials on low-risk guidelines and substance use and health. Motivation and goals were discussed. Patient was not provided with a referral to substance use treatment because Patient already has a care plan in place. Patient enrolled in [employer].

## 2024-12-06 NOTE — ED PROVIDER NOTE - PATIENT PORTAL LINK FT
You can access the FollowMyHealth Patient Portal offered by [facility] by registering at the following website: [email or web address]. By joining [employer]’s FollowMyHealth portal, you will also be able to view your health information using other applications (apps) compatible with our system.

## 2024-12-06 NOTE — ED PROVIDER NOTE - PHYSICAL EXAMINATION
Aileen Mccord DO (PGY2)   Physical Exam:    Gen: NAD, AOx3, non-toxic appearing, able to ambulate without assistance  Head: NCAT  HEENT: EOMI, PEERLA, normal conjunctiva, tongue midline, oral mucosa moist  Lung: CTAB, no respiratory distress, no wheezes/rhonchi/rales B/L  CV: RRR, no murmurs, rubs or gallops  Abd: soft, NT, ND, no guarding, no rigidity, no rebound tenderness, no CVA tenderness   MSK: no visible deformities, ROM normal in UE/LE, no back pain  Neuro: CN2-12 grossly intact, A&Ox4, MS +5/5 in UE and LE BL, gross sensation intact in UE and LE BL Aileen Mccord DO (PGY2)   Physical Exam:    Gen: NAD, AOx3  Head: NCAT  HEENT: EOMI, PEERLA, normal conjunctiva, tongue midline, oral mucosa moist  Lung: CTAB, no respiratory distress, no wheezes/rhonchi/rales B/L  CV: RRR, no murmurs, rubs or gallops  Abd: soft, NT, ND, no guarding, no rigidity, no rebound tenderness, no CVA tenderness   MSK: no visible deformities, ROM normal in UE/LE, no back pain  Neuro: CN2-12 grossly intact, A&Ox4, MS +5/5 in UE and LE BL, gross sensation intact in UE and LE BL

## 2024-12-06 NOTE — ED PROVIDER NOTE - NSICDXPASTMEDICALHX_GEN_ALL_CORE_FT
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 RUSSELL [phone], on 100 mg Methadone for at least a few months. Serenity WU

## 2024-12-06 NOTE — ED PROVIDER NOTE - NSFOLLOWUPINSTRUCTIONS_ED_ALL_ED_FT
Your symptoms were due to fentanyl ingestion. You are improved and stable to go home.      Please return if you have new or worsening symptoms, change in your mental status, or if you otherwise feel concerned.

## 2024-12-06 NOTE — ED PROVIDER NOTE - OBJECTIVE STATEMENT
45 yo F pmhx of substance abuse (benzodiazepine and fentanyl, no IV drug use) presents for lethargy and hypotension after snorting fentanyl. Patient went to [facility] where the staff NP noted that patient was lethargic and having difficulty ambulating, BP was noted to be 80-90's systolic and EMS was called. No Narcan was administered. Patient and staff deny falls or trauma. Denies fever, chills, chest pain, shortness of breath, abdominal pain, nausea, vomiting, hematuria, dysuria, or any other symptoms at this time. Aileen Mccord DO (PGY-2)

## 2024-12-06 NOTE — ED ADULT NURSE NOTE - CHIEF COMPLAINT QUOTE
was at [facility] for methadone 90 mg dose for today arrived lethargic   pt denies any fentanyl use today   pt currently denies any medical/psych  complaints #20 right arm 250 cc bolus in progress pt was hypotensive at the scene 80/51  pt changes in amb bay zero drift zero facial droop noted

## 2024-12-06 NOTE — ED PROVIDER NOTE - ATTENDING CONTRIBUTION TO CARE
Pt was seen and evaluated by me. Pt is a 43 y/o female with PMHx of substance misuse who presented to the ED for increased lethargy and hypotension today. Pt presented to the [facility] clinic where the staff NP noted that patient was lethargic and BP was noted to be 80-90's systolic and EMS was called. EMS noted pt was more responsive and did not administer Narcan. Pt denies any falls. Pt admits to using Fentanyl. Pt states she was having a migraine but did go to the clinic yesterday but did vomit after taking the medication which she says was secondary to her migraines. Pt notes she was not feeling well and felt like withdrawal and did take Fentanyl yesterday and seems to have been tired since. Pt also notes she was up late the previous night with family. Pt denies any headache, neck pain, back pain, fever, chills, nausea, vomiting, SOB, chest pain, or abd pain.   VITALS: Vitals have been reviewed.  GEN APPEARANCE: Alert and cooperative, non-toxic appearing and in NAD  HEAD: Atraumatic, normocephalic.   EYES: PERRL, EOMI.   EARS: Gross hearing intact.   NOSE: No nasal discharge.   THROAT: MMM. Oral cavity and pharynx normal.   CV: RRR, S1S2, no c/r/m/g. No cyanosis or pallor.   LUNGS: CTAB. No wheezing. No rales. No rhonchi. No diminished breath sounds.   ABDOMEN: Soft, NTND. No guarding or rebound.   MSK/EXT: Spine appears normal, no spine point tenderness.   NEURO: Alert, follows commands. Speech normal. Sensation and motor normal x4 extremities.   SKIN: Normal color for race, warm, dry and intact. No evidence of rash.  43 y/o female with PMHx of substance misuse who presented to the ED for increased lethargy and hypotension today  Concern for Substance misuse  Will get labs and give IVF and observe.

## 2024-12-06 NOTE — ED ADULT NURSE NOTE - NSFALLRISKINTERV_ED_ALL_ED
Assistance OOB with selected safe patient handling equipment if applicable/Communicate fall risk and risk factors to all staff, patient, and family/Provide patient with walking aids/Provide visual cue: yellow wristband, yellow gown, etc/Reinforce activity limits and safety measures with patient and family/Call bell, personal items and telephone in reach/Instruct patient to call for assistance before getting out of bed/chair/stretcher/Non-slip footwear applied when patient is off stretcher/[place] to call system/Physically safe environment - no spills, clutter or unnecessary equipment/Purposeful Proactive Rounding/Room/bathroom lighting operational, light cord in reach

## 2024-12-06 NOTE — ED ADULT TRIAGE NOTE - CCCP TRG CHIEF CMPLNT
pt arrives lethargic  to johan in methadone program pt appears  oriented x 4 at triage time  pt noted to NOD  off at times arousable with verbal stimuli pt arrives lethargic  to johan in methadone program pt appears  oriented x 4 at triage time  pt noted to NOD  off at times arousable with verbal stimuli/slurred speech

## 2024-12-06 NOTE — ED PROVIDER NOTE - CCCP TRG CHIEF CMPLNT
pt arrives lethargic  to j[facility] in methadone program pt appears  oriented x 4 at triage time  pt noted to NOD  off at times arousable with verbal stimuli/slurred speech

## 2024-12-06 NOTE — ED ADULT TRIAGE NOTE - CHIEF COMPLAINT QUOTE
was at [facility] for methadone 90 mg dose for today arrived lethargic   pt denies any fentanyl use today   pt currently denies any medical/psych  complaints 320 right arm 250 cc bolus in progress pt was hypotensive at the scene 80/51  pt changes in amb bay [place] [facility] [place] 90 mg dose for today arrived lethargic   pt denies any fentanyl use today   pt currently denies any medical/psych  complaints #20 right arm 250 cc bolus in progress pt was hypotensive at the scene 80/51  pt changes in amb bay zero drift zero facial droop noted

## 2024-12-06 NOTE — ED ADULT NURSE NOTE - OBJECTIVE STATEMENT
The patient is a 44y female presenting to the ED [facility] from [facility] for complaints of slurred speech. Per patient she recently ran out of her methadone which prompted her to follow up with beatrice Zarco before arriving to [facility] the patient inhaled/snorted what she believed was Fentanyl. Per EMS the patient was found severely lethargic and hypotensive to 80/51 prompting them to place a 20G PIV to the RUE and administer a full 250cc NS Bolus. On arrival to bedside patient found to be drowsy but alert x orientated x4 and responding appropriately. Patient presents with full ROM to the bilateral upper and lower extremities, and endorsed slurred speech has resolved at this time. Upon assessment Pt denies chest pain, palpitations, shortness of breath, headache, visual disturbances, numbness/tingling, fever, chills, diaphoresis,  nausea, vomiting, constipation, diarrhea, or urinary symptoms. Safety and comfort measures provided, bed locked and in lowest position, side rails up for safety. Call bell within reach. End Tidal in place and awaiting results.

## 2024-12-06 NOTE — ED ADULT NURSE NOTE - NSICDXPASTMEDICALHX_GEN_ALL_CORE_FT
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 RUSSELL [phone], on 100 mg Methadone for at least a few months. Serenity WU

## 2024-12-06 NOTE — ED PROVIDER NOTE - CLINICAL SUMMARY MEDICAL DECISION MAKING FREE TEXT BOX
45 yo F pmhx of substance abuse (benzodiazepine and fentanyl, no IV drug use) presents for lethargy and hypotension after snorting fentanyl. 45 yo F pmhx of substance abuse (benzodiazepine and fentanyl, no IV drug use) presents for lethargy and hypotension after snorting fentanyl. On exam patient hyoptensive to 84/57 and lethargic but arousal-able and talkative when aroused. Patient otherwise has non-focal exam. Will obtain labs, tox screen, chest xray 43 yo F pmhx of substance abuse (benzodiazepine and fentanyl, no IV drug use) presents for lethargy and hypotension after snorting fentanyl. On exam patient hypotensive to 84/57 and lethargic but arousal-able and talkative when aroused. Patient otherwise has non-focal exam. Will obtain labs, tox screen, chest xray, and ua to assess for infectious etiology. s/s likely due to fentanyl ingestion. Will trxt symptomatically and monitor for improvement. Likely DC with pcp f/u. Aileen Mccord,  (PGY-2)

## 2024-12-06 NOTE — ED PROVIDER NOTE - PROGRESS NOTE DETAILS
Brandee Blood PGY3 44 hx substance abuse follows at methadone  clinic at [facility], p/w AMS and lethargy, patient said she has been out of her methadone so this morning went to [facility] clinic to  more, and was lethargic, hypotensive, fluids were started. no narcan was given.  on arrival to ed , BP map 57, patient getting 1L IVF, physical exam unremarkable pupils 2 sluggish and reactive, A&OX4, no signs of trauma, bleeding from dorsum of left hand where IV was attempted. EKG sinus jackie single t wave inversion v4,. denies any active complaints. will get basic labs and evaluate for other etiology for hypotension although likely substance use. do not think pt needs Ct / Xr imaging, Dr. Youngblood confirmed with [facility] there was no signs of trauma.

## 2024-12-08 LAB
CULTURE RESULTS: SIGNIFICANT CHANGE UP
SPECIMEN SOURCE: SIGNIFICANT CHANGE UP

## 2025-01-21 NOTE — ED PROVIDER NOTE - NSCAREINITIATED _GEN_ER
Called & spoke with pt daughter about results. F/u apt has been scheduled with [facility].  ALONDRA Cornell    Jace Jacob(Attending)

## 2025-02-14 ENCOUNTER — EMERGENCY (EMERGENCY)
Facility: HOSPITAL | Age: 45
LOS: 1 days | Discharge: HOME | End: 2025-02-14
Attending: EMERGENCY MEDICINE | Admitting: EMERGENCY MEDICINE
Payer: MEDICAID

## 2025-02-14 VITALS
OXYGEN SATURATION: 100 % | RESPIRATION RATE: 15 BRPM | DIASTOLIC BLOOD PRESSURE: 65 MMHG | TEMPERATURE: 97 F | SYSTOLIC BLOOD PRESSURE: 100 MMHG | HEART RATE: 67 BPM

## 2025-02-14 VITALS
HEART RATE: 61 BPM | OXYGEN SATURATION: 100 % | DIASTOLIC BLOOD PRESSURE: 57 MMHG | SYSTOLIC BLOOD PRESSURE: 81 MMHG | RESPIRATION RATE: 18 BRPM | TEMPERATURE: 97 F

## 2025-02-14 DIAGNOSIS — Z98.890 OTHER SPECIFIED POSTPROCEDURAL STATES: Chronic | ICD-10-CM

## 2025-02-14 PROCEDURE — 99283 EMERGENCY DEPT VISIT LOW MDM: CPT

## 2025-02-14 RX ORDER — NALOXONE HYDROCHLORIDE 3 MG/.1ML
1 SPRAY NASAL
Qty: 1 | Refills: 0
Start: 2025-02-14 | End: 2025-02-14

## 2025-02-14 NOTE — ED ADULT NURSE NOTE - FINAL NURSING ELECTRONIC SIGNATURE
The patient's father returned our phone call. He states that the patient has a PMH of Schizophrenia and Bi Polar Disorder. The patient's brother  in 2020. Since his death, the patient has spiraled down and struggled emotionally. The patient has been delusional for several months. He went to the [facility] in [facility] attempting to see the President. He has been in penitentiary and was released. They attempted to obtain an ECO in [place] yesterday and they would not support same. He left the hospital due to no ECO and the family could not locate the patient. The patient's mother has a better grasp of the patient's medical history. 14-Feb-2025 15:48

## 2025-02-14 NOTE — ED ADULT NURSE NOTE - NSFALLHARMRISKINTERV_ED_ALL_ED
Assistance OOB with selected safe patient handling equipment if applicable/Communicate risk of Fall with Harm to all staff, patient, and family/Provide visual cue: red socks, yellow wristband, yellow gown, etc/Reinforce activity limits and safety measures with patient and family/Bed in lowest position, wheels locked, appropriate side rails in place/Call bell, personal items and telephone in reach/Instruct patient to call for assistance before getting out of bed/chair/stretcher/Non-slip footwear applied when patient is off stretcher/[place] to call system/Physically safe environment - no spills, clutter or unnecessary equipment/Purposeful Proactive Rounding/Room/bathroom lighting operational, light cord in reach

## 2025-02-14 NOTE — ED ADULT NURSE NOTE - OBJECTIVE STATEMENT
Pt comes from methadone clinic after use of heroin this morning. Pt arousable to verbal and physical stimuli. Spontaneous respirations are even and unlabored on room air. Pt attached to end tidal. Normal sinus on cardiac monitor.

## 2025-02-14 NOTE — ED ADULT NURSE REASSESSMENT NOTE - NS ED NURSE REASSESS COMMENT FT1
Pt awake. Pt able to tolerate PO. Spontaneous respirations are even and unlabored on room air. Pt able to speak in full sentences. No acute signs of distress noted.

## 2025-02-14 NOTE — ED PROVIDER NOTE - CLINICAL SUMMARY MEDICAL DECISION MAKING FREE TEXT BOX
44yf hx substance abuse follows w methadone clinic presents for intox from heroin this morning, no co ingestants, no signs of trauma, pt arousable. maintaining 100% o2 on RA, end tidal >20, FS 95. will monitor for clinical sobriety, and dc

## 2025-02-14 NOTE — ED PROVIDER NOTE - OBJECTIVE STATEMENT
44F sent from methadone clinic where was found to be somnolent.  Patient endorses using heroin prior to arrival. denies any other drug or alcohol use. denies any trauma/injury. on arrival pt somnolent but responsive to stimuli and easily arousable, denying any active complaints.

## 2025-02-14 NOTE — ED PROVIDER NOTE - NSFOLLOWUPINSTRUCTIONS_ED_ALL_ED_FT
You were seen in the emergency department after an overdose. Your were evaluate clinically and monitored. You are safe for discharge. Please keep all appointments as scheduled, include appointments at the methadone clinic.    We have provided you with a Narcan kit. Please carry it with you and ensure that you have someone who knows when or how to use on the kit.    SEEK IMMEDIATE MEDICAL CARE IF YOU HAVE ANY OF THE FOLLOWING SYMPTOMS: seizures, vomiting blood, blood in your stool, lightheadedness/dizziness, or becoming shaky to tremulous when you stop drinking.

## 2025-02-14 NOTE — ED PROVIDER NOTE - PATIENT PORTAL LINK FT
You can access the FollowMyHealth Patient Portal offered by [facility] by registering at the following website: [email or web address]. By joining [facility]s FollowMyHealth portal, you will also be able to view your health information using other applications (apps) compatible with our system.

## 2025-02-14 NOTE — ED PROVIDER NOTE - PROGRESS NOTE DETAILS
SG 45 yo F pmhx of substance abuse (benzodiazepine and fentanyl, no IV drug use) presents for lethargy after heroin use this morning on way to methadone clinic. no signs of trauma. will get FS, and keep her on cardiac monitor with end tidal. arousable to stimuli do not think pt needs narcan at this time. Saint Jae, DO (PGY2): reassessed patient. She's sleeping comfortably. Satting well. End tidal 36 Saint Jae, DO (PGY2): patient awake, alert, oriented x4. Able to ambulate with no issues, tolerating PO. She is requesting discharge. Will dc with narcan kit. Of note, I spoke with the patient's mother, with patient's permission. Mother is aware of patient's ED course and patient aware that her mom knows.

## 2025-02-14 NOTE — ED PROVIDER NOTE - ATTENDING CONTRIBUTION TO CARE
Seen and examined, discussed with resident.  44F sent from methadone clinic where was found to be somnolent.  Patient endorses using heroin prior to arrival.  Denies other coingestions, no history of trauma or injury, no EtOH use, no SI/HI.  Patient somnolent in ED but easily arousable.  Coherent obeying commands.  Pulse oximetry greater than 95% on 2 L nasal cannula, ET CO2 25.  MMM, clear lungs, heart reg, abd soft, NT to palp, no alicja/guarding, no CVAT, no edema, NT calves.

## 2025-02-14 NOTE — ED ADULT TRIAGE NOTE - CHIEF COMPLAINT QUOTE
pt from methadone clinic, reports snorting heroin at 0230. pt is lethargic but arouses to verbal stimuli. endorses bilateral leg pain. denies medical history.

## 2025-02-14 NOTE — CHART NOTE - NSCHARTNOTEFT_GEN_A_CORE
SW informed by RN staff that pt has been sitting outside of triage area since prior to 4pm and needs transportation set up.  SW met with pt at bedside.  Pt was A&Ox3; pt shared she has been waiting for a taxi.  SW confirmed address on file as destination.  SW arraigned transportation via [facility] with [employer] (9:30pm Inv# [id number]). SW to inform pt of  time.  SW to remain available as needs a rise.

## 2025-02-14 NOTE — ED PROVIDER NOTE - PHYSICAL EXAMINATION
GENERAL: well appearing in no acute distress, non-toxic appearing  HEAD: normocephalic, atraumatic  HEENT: 2 equal and reactive  CARDIAC: regular rate and rhythm, normal S1S2, no appreciable murmurs, 2+ pulses in UE/LE b/l  PULM: normal breath sounds, clear to ascultation bilaterally, no rales, rhonchi, wheezing  GI: abdomen nondistended, soft, nontender, no guarding, rebound tenderness  SKIN: well-perfused, extremities warm, no visible rashes

## 2025-04-11 NOTE — ED PROVIDER NOTE - ALCOHOL USE HISTORY SINGLE SELECT
PLAN:   -Decrease Toprol to 12.5mg once a day   -Discussed risks and benefits of implantable loop recorder   -patient agreeable and would like to proceed  -Discussed risks and benefits of atrial fibrillation ablation, will consider after repeat echocardiogram  -Recommend repeat echocardiogram in 5 months (prior to follow up appointment)  -Follow up with me in 6 months        Your provider has ordered testing for further evaluation.  An order/prescription has been included in your paper work.   To schedule outpatient testing, contact Central Scheduling by calling [address] [place] [address] ([phone]).    
yes...

## 2025-05-15 NOTE — ED PROVIDER NOTE - PSYCHIATRIC PERCEPTION
NAIN is likely due to pre-renal azotemia due to intravascular volume depletion in addition to being obstructive in etiology.. Baseline creatinine is 1.1. Most recent creatinine and eGFR are listed below.  Recent Labs     05/14/25  1151 05/15/25  0537   CREATININE 1.1 1.4   EGFRNORACEVR >60 56*      Plan  - NAIN is worsening. Will continue IV fluids.  Status post stent placement.  - Avoid nephrotoxins and renally dose meds for GFR listed above  - Monitor urine output, serial BMP, and adjust therapy as needed  -    normal

## 2025-06-03 ENCOUNTER — EMERGENCY (EMERGENCY)
Facility: HOSPITAL | Age: 45
LOS: 1 days | End: 2025-06-03
Attending: EMERGENCY MEDICINE
Payer: MEDICAID

## 2025-06-03 VITALS
RESPIRATION RATE: 17 BRPM | HEART RATE: 75 BPM | TEMPERATURE: 98 F | SYSTOLIC BLOOD PRESSURE: 96 MMHG | OXYGEN SATURATION: 97 % | DIASTOLIC BLOOD PRESSURE: 55 MMHG

## 2025-06-03 VITALS
WEIGHT: 179.9 LBS | HEART RATE: 71 BPM | TEMPERATURE: 99 F | RESPIRATION RATE: 17 BRPM | HEIGHT: 66 IN | SYSTOLIC BLOOD PRESSURE: 105 MMHG | OXYGEN SATURATION: 95 % | DIASTOLIC BLOOD PRESSURE: 56 MMHG

## 2025-06-03 DIAGNOSIS — Z98.890 OTHER SPECIFIED POSTPROCEDURAL STATES: Chronic | ICD-10-CM

## 2025-06-03 LAB
ALBUMIN SERPL ELPH-MCNC: 3.8 G/DL — SIGNIFICANT CHANGE UP (ref 3.3–5)
ALP SERPL-CCNC: 50 U/L — SIGNIFICANT CHANGE UP (ref 40–120)
ALT FLD-CCNC: 13 U/L — SIGNIFICANT CHANGE UP (ref 10–45)
ANION GAP SERPL CALC-SCNC: 12 MMOL/L — SIGNIFICANT CHANGE UP (ref 5–17)
ANISOCYTOSIS BLD QL: SLIGHT — SIGNIFICANT CHANGE UP
AST SERPL-CCNC: 16 U/L — SIGNIFICANT CHANGE UP (ref 10–40)
BASOPHILS # BLD AUTO: 0.05 K/UL — SIGNIFICANT CHANGE UP (ref 0–0.2)
BASOPHILS NFR BLD AUTO: 0.8 % — SIGNIFICANT CHANGE UP (ref 0–2)
BILIRUB SERPL-MCNC: 0.3 MG/DL — SIGNIFICANT CHANGE UP (ref 0.2–1.2)
BUN SERPL-MCNC: 15 MG/DL — SIGNIFICANT CHANGE UP (ref 7–23)
BURR CELLS BLD QL SMEAR: PRESENT — SIGNIFICANT CHANGE UP
CALCIUM SERPL-MCNC: 9.1 MG/DL — SIGNIFICANT CHANGE UP (ref 8.4–10.5)
CHLORIDE SERPL-SCNC: 100 MMOL/L — SIGNIFICANT CHANGE UP (ref 96–108)
CO2 SERPL-SCNC: 23 MMOL/L — SIGNIFICANT CHANGE UP (ref 22–31)
CREAT SERPL-MCNC: 1.12 MG/DL — SIGNIFICANT CHANGE UP (ref 0.5–1.3)
DACRYOCYTES BLD QL SMEAR: SLIGHT — SIGNIFICANT CHANGE UP
EGFR: 62 ML/MIN/1.73M2 — SIGNIFICANT CHANGE UP
EGFR: 62 ML/MIN/1.73M2 — SIGNIFICANT CHANGE UP
ELLIPTOCYTES BLD QL SMEAR: SLIGHT — SIGNIFICANT CHANGE UP
EOSINOPHIL # BLD AUTO: 0.16 K/UL — SIGNIFICANT CHANGE UP (ref 0–0.5)
EOSINOPHIL NFR BLD AUTO: 2.6 % — SIGNIFICANT CHANGE UP (ref 0–6)
ETHANOL SERPL-MCNC: <10 MG/DL — SIGNIFICANT CHANGE UP (ref 0–10)
GLUCOSE SERPL-MCNC: 103 MG/DL — HIGH (ref 70–99)
HCT VFR BLD CALC: 24.7 % — LOW (ref 34.5–45)
HGB BLD-MCNC: 7.3 G/DL — LOW (ref 11.5–15.5)
HYPOCHROMIA BLD QL: SLIGHT — SIGNIFICANT CHANGE UP
LYMPHOCYTES # BLD AUTO: 2.06 K/UL — SIGNIFICANT CHANGE UP (ref 1–3.3)
LYMPHOCYTES # BLD AUTO: 34.5 % — SIGNIFICANT CHANGE UP (ref 13–44)
MANUAL SMEAR VERIFICATION: SIGNIFICANT CHANGE UP
MCHC RBC-ENTMCNC: 19.3 PG — LOW (ref 27–34)
MCHC RBC-ENTMCNC: 29.6 G/DL — LOW (ref 32–36)
MCV RBC AUTO: 65.3 FL — LOW (ref 80–100)
MICROCYTES BLD QL: SLIGHT — SIGNIFICANT CHANGE UP
MONOCYTES # BLD AUTO: 0.31 K/UL — SIGNIFICANT CHANGE UP (ref 0–0.9)
MONOCYTES NFR BLD AUTO: 5.2 % — SIGNIFICANT CHANGE UP (ref 2–14)
NEUTROPHILS # BLD AUTO: 3.4 K/UL — SIGNIFICANT CHANGE UP (ref 1.8–7.4)
NEUTROPHILS NFR BLD AUTO: 56.9 % — SIGNIFICANT CHANGE UP (ref 43–77)
OVALOCYTES BLD QL SMEAR: SLIGHT — SIGNIFICANT CHANGE UP
PLAT MORPH BLD: ABNORMAL
PLATELET # BLD AUTO: 311 K/UL — SIGNIFICANT CHANGE UP (ref 150–400)
POIKILOCYTOSIS BLD QL AUTO: SIGNIFICANT CHANGE UP
POTASSIUM SERPL-MCNC: 4.3 MMOL/L — SIGNIFICANT CHANGE UP (ref 3.5–5.3)
POTASSIUM SERPL-SCNC: 4.3 MMOL/L — SIGNIFICANT CHANGE UP (ref 3.5–5.3)
PROT SERPL-MCNC: 6.6 G/DL — SIGNIFICANT CHANGE UP (ref 6–8.3)
RBC # BLD: 3.78 M/UL — LOW (ref 3.8–5.2)
RBC # FLD: 18.7 % — HIGH (ref 10.3–14.5)
RBC BLD AUTO: ABNORMAL
SODIUM SERPL-SCNC: 135 MMOL/L — SIGNIFICANT CHANGE UP (ref 135–145)
WBC # BLD: 5.97 K/UL — SIGNIFICANT CHANGE UP (ref 3.8–10.5)
WBC # FLD AUTO: 5.97 K/UL — SIGNIFICANT CHANGE UP (ref 3.8–10.5)

## 2025-06-03 PROCEDURE — 85025 COMPLETE CBC W/AUTO DIFF WBC: CPT

## 2025-06-03 PROCEDURE — 99284 EMERGENCY DEPT VISIT MOD MDM: CPT

## 2025-06-03 PROCEDURE — 80053 COMPREHEN METABOLIC PANEL: CPT

## 2025-06-03 PROCEDURE — 80307 DRUG TEST PRSMV CHEM ANLYZR: CPT

## 2025-06-03 PROCEDURE — 99283 EMERGENCY DEPT VISIT LOW MDM: CPT

## 2025-06-03 PROCEDURE — 82962 GLUCOSE BLOOD TEST: CPT

## 2025-06-03 NOTE — ED ADULT NURSE REASSESSMENT NOTE - NS ED NURSE REASSESS COMMENT FT1
DR ALVARO Philippe aware pt refused CT scan and spoke with pt. pt remains refusing ct scan
Pt tolerated dinner tray and does not want to stay, has someone to come pick her up as per Dr TAD Greenfield.

## 2025-06-03 NOTE — ED PROVIDER NOTE - OBJECTIVE STATEMENT
44Y F hx polysubstance use in remission, on daily methadone, anxiety/depression presenting with AMS. 44Y F hx polysubstance use in remission, on daily methadone, anxiety/depression presenting with AMS. Bystander called EMS as patient was reportedly sleeping on their front steps. By the time EMS arrived patient had gotten up and walked to another location where she again fell asleep. Patient reports taking her 100mg methadone this AM as prescribed, told EMS that she is on a new medication for anxiety as well although does not know the name. Denies other substances. Denies falls/trauma.
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
## 2025-06-03 NOTE — ED PROVIDER NOTE - PATIENT PORTAL LINK FT
You can access the FollowMyHealth Patient Portal offered by [facility] by registering at the following website: [email or web address]. By joining [employer]’s FollowMyHealth portal, you will also be able to view your health information using other applications (apps) compatible with our system.

## 2025-06-03 NOTE — ED ADULT TRIAGE NOTE - NSWEIGHTCALCTOOLDRUG_GEN_A_CORE
Mildly to Moderately Impaired: difficulty hearing in some environments or speaker may need to increase volume or speak distinctly
 used

## 2025-06-03 NOTE — ED PROVIDER NOTE - NSICDXPASTMEDICALHX_GEN_ALL_CORE_FT
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 RUSSELL [phone], on 100 mg Methadone for at least a few months. Serenity WU

## 2025-06-03 NOTE — ED ADULT NURSE NOTE - NSICDXPASTMEDICALHX_GEN_ALL_CORE_FT
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 RUSSELL [phone], on 100 mg Methadone for at least a few months. Serenity WU

## 2025-06-03 NOTE — ED PROVIDER NOTE - PHYSICAL EXAMINATION
GENERAL: Awake, alert, somnolent but arousable, nontoxic, NAD  HEAD: NC/AT, neck supple, moist mucous membranes  EYES: PERRL, EOM grossly intact, sclera anicteric  LUNGS: normal WOB on RA, CTAB, no wheezes or crackles   CARDIAC: RRR, no m/r/g  ABDOMEN: Soft, non tender, non distended, no rebound, no guarding  BACK: No midline spinal tenderness, no CVA tenderness  EXT: No edema, no calf tenderness, no deformities.  NEURO: A&Ox3. Moving all extremities.  SKIN: Warm and dry. No rash.  PSYCH: Normal affect.

## 2025-06-03 NOTE — ED PROVIDER NOTE - NSFOLLOWUPINSTRUCTIONS_ED_ALL_ED_FT
You were seen in the ER today for headache and altered mental status.     The results of all of the testing performed today are included in this paperwork.    Continue all of your medications as currently prescribed.     Follow up with your primary doctor in the next 2-3 days to be re-evaluated. Bring this paperwork with you to any follow-up appointments to discuss the results of any testing performed at today's visit.    Return to the ER if you develop any new or worsening symptoms including chest pain, difficulty breathing, or if you are otherwise concerned.

## 2025-06-03 NOTE — ED PROVIDER NOTE - ATTENDING CONTRIBUTION TO CARE
This is a 44-year-old female on methadone maintenance program who EMS reports to me that she is on methadone and was called by a bystander who stated that she was sleeping on their steps outside their house.  When they arrived they stated that she was no longer there and she was sitting somewhere else awake.  She did not want to come to the hospital.  She states that she was leaving her house to go to the pharmacy to get Excedrin because she has a headache condition and she was having a headache.  She states that now its gone.  Right now she says she has no symptoms at all.  Patient is awake alert talking and in no acute distress.  Fingerstick was 110s.  On exam her pupils are 3 mm and reactive.  She is talking with fluid speech no pronator drift and moving the lower extremities as well.  Regular rate and rhythm with no murmur.  Given that she had a mental status change and the headache I would like to do a CT of her head.  Initially she had agreed to this but she told the resident who saw the patient after me that she is declining this at this time.  She told the triage nurse that she was on a new medication she did not tell me this and is hesitant to divulge further information.  CBC CMP and will try to get the patient to agree to imaging and to determine if there is any cause for her symptoms.
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
## 2025-06-03 NOTE — ED PROVIDER NOTE - PROGRESS NOTE DETAILS
Isa Tamayo MD PGY-3: The patient wishes to be discharged against medical advice. I have assessed the patient's mental status and  the patient has capacity to make this decision. I have explained the risks of leaving without full treatment, including severe disability and death, which the patient understands and is willing to accept. I have answered all of the patient's questions. I reiterated my medical opinion and advised the patient to return at any time. We discussed the further workup outside of the current visit and return precautions. Isa Tamayo MD PGY-3: The patient wishes to be discharged against medical advice. Specifically refusing recommended CT scan, prefers to DC at this time. I have assessed the patient's mental status and  the patient has capacity to make this decision. I have explained the risks of leaving without full treatment, including severe disability and death, which the patient understands and is willing to accept. I have answered all of the patient's questions. I reiterated my medical opinion and advised the patient to return at any time. We discussed the further workup outside of the current visit and return precautions.

## 2025-06-03 NOTE — ED ADULT NURSE REASSESSMENT NOTE - REASSESS COMMUNICATION
Dr TAD Tamayo made aware that pt is declining ct scan at present/ED physician notified
ED physician notified

## 2025-06-04 ENCOUNTER — EMERGENCY (EMERGENCY)
Facility: HOSPITAL | Age: 45
LOS: 1 days | End: 2025-06-04
Attending: STUDENT IN AN ORGANIZED HEALTH CARE EDUCATION/TRAINING PROGRAM | Admitting: STUDENT IN AN ORGANIZED HEALTH CARE EDUCATION/TRAINING PROGRAM
Payer: MEDICAID

## 2025-06-04 VITALS
HEART RATE: 90 BPM | SYSTOLIC BLOOD PRESSURE: 99 MMHG | OXYGEN SATURATION: 100 % | RESPIRATION RATE: 18 BRPM | TEMPERATURE: 98 F | DIASTOLIC BLOOD PRESSURE: 52 MMHG

## 2025-06-04 VITALS
RESPIRATION RATE: 16 BRPM | TEMPERATURE: 98 F | DIASTOLIC BLOOD PRESSURE: 45 MMHG | HEIGHT: 66 IN | OXYGEN SATURATION: 100 % | HEART RATE: 69 BPM | SYSTOLIC BLOOD PRESSURE: 90 MMHG

## 2025-06-04 DIAGNOSIS — Z98.890 OTHER SPECIFIED POSTPROCEDURAL STATES: Chronic | ICD-10-CM

## 2025-06-04 LAB
ALBUMIN SERPL ELPH-MCNC: 3.7 G/DL — SIGNIFICANT CHANGE UP (ref 3.3–5)
ALP SERPL-CCNC: 49 U/L — SIGNIFICANT CHANGE UP (ref 40–120)
ALT FLD-CCNC: 13 U/L — SIGNIFICANT CHANGE UP (ref 4–33)
ANION GAP SERPL CALC-SCNC: 9 MMOL/L — SIGNIFICANT CHANGE UP (ref 7–14)
APAP SERPL-MCNC: <10 UG/ML — LOW (ref 15–25)
AST SERPL-CCNC: 18 U/L — SIGNIFICANT CHANGE UP (ref 4–32)
BASOPHILS # BLD AUTO: 0.05 K/UL — SIGNIFICANT CHANGE UP (ref 0–0.2)
BASOPHILS NFR BLD AUTO: 0.9 % — SIGNIFICANT CHANGE UP (ref 0–2)
BILIRUB SERPL-MCNC: <0.2 MG/DL — SIGNIFICANT CHANGE UP (ref 0.2–1.2)
BLD GP AB SCN SERPL QL: NEGATIVE — SIGNIFICANT CHANGE UP
BUN SERPL-MCNC: 20 MG/DL — SIGNIFICANT CHANGE UP (ref 7–23)
CALCIUM SERPL-MCNC: 8.3 MG/DL — LOW (ref 8.4–10.5)
CHLORIDE SERPL-SCNC: 105 MMOL/L — SIGNIFICANT CHANGE UP (ref 98–107)
CK SERPL-CCNC: 71 U/L — SIGNIFICANT CHANGE UP (ref 25–170)
CO2 SERPL-SCNC: 23 MMOL/L — SIGNIFICANT CHANGE UP (ref 22–31)
CREAT SERPL-MCNC: 1.15 MG/DL — SIGNIFICANT CHANGE UP (ref 0.5–1.3)
EGFR: 60 ML/MIN/1.73M2 — SIGNIFICANT CHANGE UP
EGFR: 60 ML/MIN/1.73M2 — SIGNIFICANT CHANGE UP
EOSINOPHIL # BLD AUTO: 0.24 K/UL — SIGNIFICANT CHANGE UP (ref 0–0.5)
EOSINOPHIL NFR BLD AUTO: 4.3 % — SIGNIFICANT CHANGE UP (ref 0–6)
ETHANOL SERPL-MCNC: <10 MG/DL — SIGNIFICANT CHANGE UP
GAS PNL BLDV: SIGNIFICANT CHANGE UP
GLUCOSE SERPL-MCNC: 91 MG/DL — SIGNIFICANT CHANGE UP (ref 70–99)
HCT VFR BLD CALC: 23.6 % — LOW (ref 34.5–45)
HGB BLD-MCNC: 7.2 G/DL — LOW (ref 11.5–15.5)
IANC: 2.93 K/UL — SIGNIFICANT CHANGE UP (ref 1.8–7.4)
LYMPHOCYTES # BLD AUTO: 1.72 K/UL — SIGNIFICANT CHANGE UP (ref 1–3.3)
LYMPHOCYTES # BLD AUTO: 30.4 % — SIGNIFICANT CHANGE UP (ref 13–44)
MAGNESIUM SERPL-MCNC: 1.9 MG/DL — SIGNIFICANT CHANGE UP (ref 1.6–2.6)
MCHC RBC-ENTMCNC: 20.1 PG — LOW (ref 27–34)
MCHC RBC-ENTMCNC: 30.5 G/DL — LOW (ref 32–36)
MCV RBC AUTO: 65.7 FL — LOW (ref 80–100)
MONOCYTES # BLD AUTO: 0.4 K/UL — SIGNIFICANT CHANGE UP (ref 0–0.9)
MONOCYTES NFR BLD AUTO: 7 % — SIGNIFICANT CHANGE UP (ref 2–14)
NEUTROPHILS # BLD AUTO: 3.05 K/UL — SIGNIFICANT CHANGE UP (ref 1.8–7.4)
NEUTROPHILS NFR BLD AUTO: 53.9 % — SIGNIFICANT CHANGE UP (ref 43–77)
PHOSPHATE SERPL-MCNC: 3.2 MG/DL — SIGNIFICANT CHANGE UP (ref 2.5–4.5)
PLATELET # BLD AUTO: 304 K/UL — SIGNIFICANT CHANGE UP (ref 150–400)
POTASSIUM SERPL-MCNC: 4.1 MMOL/L — SIGNIFICANT CHANGE UP (ref 3.5–5.3)
POTASSIUM SERPL-SCNC: 4.1 MMOL/L — SIGNIFICANT CHANGE UP (ref 3.5–5.3)
PROT SERPL-MCNC: 6 G/DL — SIGNIFICANT CHANGE UP (ref 6–8.3)
RBC # BLD: 3.59 M/UL — LOW (ref 3.8–5.2)
RBC # FLD: 18.9 % — HIGH (ref 10.3–14.5)
RH IG SCN BLD-IMP: POSITIVE — SIGNIFICANT CHANGE UP
SALICYLATES SERPL-MCNC: <0.3 MG/DL — LOW (ref 15–30)
SODIUM SERPL-SCNC: 137 MMOL/L — SIGNIFICANT CHANGE UP (ref 135–145)
WBC # BLD: 5.66 K/UL — SIGNIFICANT CHANGE UP (ref 3.8–10.5)
WBC # FLD AUTO: 5.66 K/UL — SIGNIFICANT CHANGE UP (ref 3.8–10.5)

## 2025-06-04 PROCEDURE — 99284 EMERGENCY DEPT VISIT MOD MDM: CPT

## 2025-06-04 PROCEDURE — 93010 ELECTROCARDIOGRAM REPORT: CPT

## 2025-06-04 NOTE — ED PROVIDER NOTE - PHYSICAL EXAMINATION
General: WN/WD NAD  Head: Atraumatic, pupils 1-2 mm reactive  Eyes: EOM grossly in tact, no scleral icterus  ENT: moist mucous membranes  Neurology: A&Ox3, nonfocal, GARCIA x 4  Respiratory: normal respiratory effort  CV: Extremities warm and well perfused  Abdominal: Soft, non-distended  Extremities: No edema  Skin: No rashes

## 2025-06-04 NOTE — ED PROVIDER NOTE - NSFOLLOWUPINSTRUCTIONS_ED_ALL_ED_FT
Advance activity as tolerated.  Continue all previously prescribed medications as directed unless otherwise instructed.  Follow up with your primary care physician in 48-72 hours- bring copies of your results.  Return to the ER for worsening or persistent symptoms, and/or ANY NEW OR CONCERNING SYMPTOMS. If you have issues obtaining follow up, please call: [phone] to obtain a doctor or specialist who takes your insurance in your area.  You may call [phone] to make an appointment with the internal medicine clinic.

## 2025-06-04 NOTE — ED ADULT NURSE NOTE - OBJECTIVE STATEMENT
Pt received directly to room 2. pt is AxO 3 and ambulatory. pt brought in by EMS from methadone clinic due to "appearing to sleepy, and weak" as per pt. Pt endorses beign seen in [facility] yesterday for similar complaints, and anemia. Pt denies use of blood thinners. Denies ETOH, auditory, or visual hallucinations. Pt states "using heroin yesterday, but there was no heroin in the drug it had other drugs in it". Pt hx of heroin use, anemia, and migraines. Pt normal sinus on the tel monitor. Hypotensive 80's/60's, to 90's/60's. Pt abdomen is soft, nontender, and nondistended. pt denies LOC, head strike. -BEFAST, and positive PERRLA. Equal pulses bilaterally. Respirations are even and unlabored. on room air. Arrives with 20G from EMS to the left wrist. pt has a 18G to the right AC. Pt medicated as prescribed. plan of care ongoing. Pt received directly to room 2. pt is AxO 3 and ambulatory. pt brought in by EMS from methadone clinic due to "appearing to sleepy, and weak" as per pt. Pt endorses beign seen in [facility] yesterday for similar complaints, and anemia. Pt denies use of blood thinners. Denies ETOH, auditory, or visual hallucinations. Pt states "using heroin yesterday, but there was no heroin in the drug it had other drugs in it". Pt hx of heroin use, anemia, and migraines. Pt normal sinus on the tel monitor. Hypotensive 80's/60's, to 90's/60's. Pt abdomen is soft, nontender, and nondistended. pt denies LOC, head strike. -BEFAST, and positive PERRLA. Equal pulses bilaterally. Respirations are even and unlabored. on room air. Arrives with 20G from EMS to the left wrist. pt has a 18G to the right AC. Pt medicated as prescribed. plan of care ongoing.    Addendum: Pt belonging attempted to be collected as per hospital policy. Pt refused. Security called for assistance. MD Geiger and MD Hsieh evaluated and states to pt due to pt having capacity, pt is allowed to keep her belonging's. Charge and ANM aware of circumstance.     MD made aware of pts coagulation of Coags that had been sent tot he lab. plan of care ongoing. Pt received directly to room 2. pt is AxO 3 and ambulatory. pt brought in by EMS from methadone clinic due to "appearing to sleepy, and weak" as per pt. Pt endorses beign seen in [facility] yesterday for similar complaints, and anemia. Pt denies use of blood thinners. Denies ETOH, auditory, or visual hallucinations. Pt states "using heroin yesterday, but there was no heroin in the drug it had other drugs in it". Pt hx of heroin use, anemia, and migraines. Pt normal sinus on the tel monitor. Hypotensive 80's/60's, to 90's/60's. Pt abdomen is soft, nontender, and nondistended. pt denies LOC, head strike. -BEFAST, and positive PERRLA. Equal pulses bilaterally. Respirations are even and unlabored. on room air. Arrives with 20G from EMS to the left wrist. pt has a 18G to the right AC. Pt medicated as prescribed. plan of care ongoing.    Addendum: Pt belonging attempted to be collected as per hospital policy. Pt refused. Security called for assistance. MD Geiger and MD Hsieh evaluated and state pt has capacity, pt is allowed to keep her belonging's. Pt denies drug use today. Charge and ANM aware of circumstance.     MD made aware of pts coagulation of Coags that had been sent tot he lab. plan of care ongoing. Pt received directly to room 2. pt is AxO 3 and ambulatory. pt brought in by EMS from methadone clinic due to "appearing to sleepy, and weak" as per pt. Pt endorses being seen in [facility] yesterday for similar complaints, and anemia. Pt denies use of blood thinners. Denies ETOH, auditory, or visual hallucinations. Pt states "using heroin yesterday, but there was no heroin in the drug it had other drugs in it". Pt hx of heroin use, anemia, and migraines. Pt normal sinus on the tele monitor. Hypotensive 80's/60's, to 90's/60's. Pt abdomen is soft, nontender, and nondistended. pt denies LOC, head strike. -BEFAST, and positive PERRLA. Equal pulses bilaterally. Respirations are even and unlabored. on room air. Arrives with 20G from EMS to the left wrist. pt has a 18G to the right AC. Pt medicated as prescribed. plan of care ongoing.    Addendum: Pt belonging attempted to be collected as per hospital policy. Pt refused. Security called for assistance. MD Geiger and MD Hsieh evaluated and state pt has capacity, pt is allowed to keep her belonging's. Pt denies drug use today. Charge and ANM aware of circumstance.     MD made aware of pts coagulation of Coags that had been sent tot he lab. plan of care ongoing.

## 2025-06-04 NOTE — ED PROVIDER NOTE - PROGRESS NOTE DETAILS
Dax Hsieh MD:  Patient assessed at this time, determined to have capacity, with recognition of concerns for severe hypotension with concerns for anemia based on multiorgan dysfunction and prior blood work requiring emergent blood transfusion. States she is refusing a blood transfusion at this time due to long-help belief systems which include avoiding "COVID-Vaccine contaminated" blood. Adamant at this time that she will refuse all blood transfusions due to this belief. Consistent with rationale given yesterday during prior evaluation. As this is a long-standing belief with appreciation of consequences of choices this patient has capacity to refuse interventions and leave the hospital of her own volition. The patient wishes to be discharged against medical advice. I have assessed the patient's mental status and  the patient has capacity to make this decision. I have explained the risks of leaving without full treatment, including severe disability and death, which the patient understands and is willing to accept. I have answered all of the patient's questions. I reiterated my medical opinion and advised the patient to return at any time. We discussed the further workup outside of the current visit and return precautions.
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
## 2025-06-04 NOTE — ED ADULT NURSE NOTE - CHIEF COMPLAINT QUOTE
sent from methadone clinic today, "appeared impaired" and methadone held. was sent to [facility] yesterday, found on street "falling asleep" blood work showed low H&H, pt left AMA. pt endorses lethargy. no active bleeding or use of blood thinners. denies dizziness, chest pain, sob. BP 70s/40s in field. arrives [id number] IV to L hand, NS infusing.   hx: migraines.

## 2025-06-04 NOTE — ED PROVIDER NOTE - NSICDXPASTMEDICALHX_GEN_ALL_CORE_FT
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 RUSSELL [phone], on 100 mg Methadone for at least a few months. Serenity WU

## 2025-06-04 NOTE — ED PROVIDER NOTE - PATIENT PORTAL LINK FT
You can access the FollowMyHealth Patient Portal offered by [facility] by registering at the following website: [email or web address]. By joining [email or web address]’s FollowMyHealth portal, you will also be able to view your health information using other applications (apps) compatible with our system.

## 2025-06-04 NOTE — ED PROVIDER NOTE - CLINICAL SUMMARY MEDICAL DECISION MAKING FREE TEXT BOX
44 Y F presenting with hypotension, mild somnolence, concerning for anemia of unknown origin, patient refusing interventions aside from fluids at this time.

## 2025-06-04 NOTE — ED ADULT NURSE NOTE - IN ACCORDANCE WITH [place] STATE LAW, WE OFFER EVERY PATIENT A HEPATITIS C TEST. WOULD YOU LIKE TO BE TESTED TODAY?
pt had c section on the 29th of april. pt was d/[facility] yesterday  pt had preeclampsia during pregnancy/  pt c/o headache and pain from back  of neck over shoulders and headache not going away   pt taking labetalol 500 bid and procardia 30 q day Opt out

## 2025-06-04 NOTE — ED ADULT NURSE REASSESSMENT NOTE - NS ED NURSE REASSESS COMMENT FT1
AxO 3. respirations even and unlabored. pt denies headaches, dizziness, n/v/d, abdominal pain, SOB, chest pain, or fever like symptoms. pt ripped out IV's. MD Hsieh at bedside, pt signing AMA forms.

## 2025-06-04 NOTE — ED ADULT NURSE NOTE - NSICDXPASTMEDICALHX_GEN_ALL_CORE_FT
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 RUSSELL [phone], on 100 mg Methadone for at least a few months. Serenity WU

## 2025-06-04 NOTE — ED PROVIDER NOTE - OBJECTIVE STATEMENT
44 Y F H/O polysubstance use, daily methadone 100 mg, last use heroin yesterday, presenting due to hypotension at methadone clinic today. States after AMAing from ED ([facility]) yesterday, presented to methadone clinic for dose this morning, found to be somnelent with difficulty awaking. Found to be hypotensive at facility reported 70s/40s per EMS. yesterday found to have severe anemia to 7.3 downtrending from baseline of ~10, with creatinine elevation and somnolence concerning for organ injury. Presenting today with no acute complains aside from daily weakness, lethargy. Denies any other complaints at this time.

## 2025-06-04 NOTE — ED PROVIDER NOTE - ATTENDING CONTRIBUTION TO CARE
43 yo F with h/o polysubstance use, daily methadone 100 mg, last use heroin yesterday who presents to the ED, sent in from methadone clinic, for somnolence. Pt reports she has been feeling very fatigued recently and just closed her eyes for a moment at the clinic. She was hypotensive en route per EMS and hypotensive here but mentating normally, alert and oriented x 3. Per chart review, pt was seen at [facility] yesterday, labs notable for anemia of 7.3, downtrending from 10's several months ago. Pt denies vaginal bleeding, black/bloody stool. Reports that she will "never agree" to a blood transfusion due to concerns that she may receive blood from someone who received the covid vaccine. Plan for IVF's for hypotension, labs

## 2025-08-04 PROCEDURE — 90832 PSYTX W PT 30 MINUTES: CPT | Mod: 95

## 2025-08-05 PROCEDURE — 90832 PSYTX W PT 30 MINUTES: CPT | Mod: 95
